# Patient Record
Sex: MALE | Race: WHITE | NOT HISPANIC OR LATINO | Employment: FULL TIME | URBAN - NONMETROPOLITAN AREA
[De-identification: names, ages, dates, MRNs, and addresses within clinical notes are randomized per-mention and may not be internally consistent; named-entity substitution may affect disease eponyms.]

---

## 2017-01-11 ENCOUNTER — OFFICE VISIT (OUTPATIENT)
Dept: FAMILY MEDICINE | Facility: CLINIC | Age: 47
End: 2017-01-11
Payer: COMMERCIAL

## 2017-01-11 VITALS
TEMPERATURE: 97.9 F | DIASTOLIC BLOOD PRESSURE: 88 MMHG | SYSTOLIC BLOOD PRESSURE: 132 MMHG | HEART RATE: 57 BPM | WEIGHT: 192 LBS | BODY MASS INDEX: 26.88 KG/M2 | RESPIRATION RATE: 16 BRPM | OXYGEN SATURATION: 98 % | HEIGHT: 71 IN

## 2017-01-11 DIAGNOSIS — I25.10 CORONARY ARTERY DISEASE INVOLVING NATIVE CORONARY ARTERY OF NATIVE HEART WITHOUT ANGINA PECTORIS: Primary | ICD-10-CM

## 2017-01-11 DIAGNOSIS — E78.5 HYPERLIPIDEMIA LDL GOAL <130: ICD-10-CM

## 2017-01-11 DIAGNOSIS — I10 ESSENTIAL HYPERTENSION: ICD-10-CM

## 2017-01-11 DIAGNOSIS — B02.9 HERPES ZOSTER WITHOUT COMPLICATION: ICD-10-CM

## 2017-01-11 LAB
ANION GAP SERPL CALCULATED.3IONS-SCNC: 5 MMOL/L (ref 3–14)
BUN SERPL-MCNC: 20 MG/DL (ref 7–30)
CALCIUM SERPL-MCNC: 8.9 MG/DL (ref 8.5–10.1)
CHLORIDE SERPL-SCNC: 108 MMOL/L (ref 94–109)
CO2 SERPL-SCNC: 30 MMOL/L (ref 20–32)
CREAT SERPL-MCNC: 1.19 MG/DL (ref 0.66–1.25)
GFR SERPL CREATININE-BSD FRML MDRD: 65 ML/MIN/1.7M2
GLUCOSE SERPL-MCNC: 90 MG/DL (ref 70–99)
POTASSIUM SERPL-SCNC: 4.5 MMOL/L (ref 3.4–5.3)
SODIUM SERPL-SCNC: 143 MMOL/L (ref 133–144)

## 2017-01-11 PROCEDURE — 80048 BASIC METABOLIC PNL TOTAL CA: CPT | Performed by: FAMILY MEDICINE

## 2017-01-11 PROCEDURE — 36415 COLL VENOUS BLD VENIPUNCTURE: CPT | Performed by: FAMILY MEDICINE

## 2017-01-11 PROCEDURE — 99214 OFFICE O/P EST MOD 30 MIN: CPT | Performed by: FAMILY MEDICINE

## 2017-01-11 RX ORDER — VALACYCLOVIR HYDROCHLORIDE 1 G/1
1000 TABLET, FILM COATED ORAL 3 TIMES DAILY
Qty: 21 TABLET | Refills: 0 | Status: SHIPPED | OUTPATIENT
Start: 2017-01-11 | End: 2018-01-29

## 2017-01-11 RX ORDER — ATORVASTATIN CALCIUM 80 MG/1
80 TABLET, FILM COATED ORAL AT BEDTIME
Qty: 90 TABLET | Refills: 3 | Status: SHIPPED | OUTPATIENT
Start: 2017-01-11 | End: 2017-12-18

## 2017-01-11 RX ORDER — LISINOPRIL 10 MG/1
10 TABLET ORAL DAILY
Qty: 90 TABLET | Refills: 3 | Status: SHIPPED | OUTPATIENT
Start: 2017-01-11 | End: 2017-12-18

## 2017-01-11 NOTE — PROGRESS NOTES
"  SUBJECTIVE:                                                    Talon Sanchez is a 46 year old male who presents to clinic today for the following health issues:      Hypertension Follow-up      Outpatient blood pressures are not being checked.    Low Salt Diet: not monitoring salt       Amount of exercise or physical activity: None    Problems taking medications regularly: No    Medication side effects: none    Diet: regular (no restrictions)    Rash      Duration: 3 days ago and worsening     Description  Location: Face, forehead, left eye, left side of face down to jaw line   Itching: no    Intensity:  moderate    Accompanying signs and symptoms: burning, red blotches, swelling on eye lid     History (similar episodes/previous evaluation): None    Precipitating or alleviating factors:  New exposures:  None  Recent travel: no      Therapies tried and outcome: none       S: Talon Sanchez is a 46 year old male with CAD.  Stable since stent.  No cp or sob  Htn: lisinopril.  Controlled. Labs and fills  Hyperlipidemia: high dose statin.  Tolerating  Rash on face.  A few days.  Jauregui, L forehead.  Some on upper lid    Problem list, med list, additional histories reviewed and updated, as indicated.      No cp or sob    O:/88 mmHg  Pulse 57  Temp(Src) 97.9  F (36.6  C) (Tympanic)  Resp 16  Ht 5' 11\" (1.803 m)  Wt 192 lb (87.091 kg)  BMI 26.79 kg/m2  SpO2 98%  GEN: Alert and oriented, in no acute distress  CV: RRR, no murmur  RESP: lungs clear bilaterally, good effort  EXT: no edema or lesions noted in lower extremities  Has some red spots with early vesicles L forehead.  Eye fine, other than some lid redness    Fluorescein and blue light with no corneal lesions.    A: cad, stable      Htn, stable       Hyperlipidemia, stable       Early shingles    P: valtrex. F/u if eye bothering or rash worsening  Fills on other meds.  Update labs    Weight management plan: diet/exercise     "

## 2017-01-11 NOTE — MR AVS SNAPSHOT
"              After Visit Summary   2017    Talon Sanchez    MRN: [id number]           Patient Information     Date Of Birth          1970        Visit Information        Provider Department      2017 11:20 AM Orlin Marie MD [facility]        Today's Diagnoses     Coronary artery disease involving native coronary artery of native heart without angina pectoris    -  1     Hyperlipidemia LDL goal <130         Essential hypertension         Herpes zoster without complication            Follow-ups after your visit        Who to contact     If you have questions or need follow up information about today's clinic visit or your schedule please contact [facility] directly at [phone].  Normal or non-critical lab and imaging results will be communicated to you by MyChart, letter or phone within 4 business days after the clinic has received the results. If you do not hear from us within 7 days, please contact the clinic through [employer] or phone. If you have a critical or abnormal lab result, we will notify you by phone as soon as possible.  Submit refill requests through [employer] or call your pharmacy and they will forward the refill request to us. Please allow 3 business days for your refill to be completed.          Additional Information About Your Visit        MyChart Information     [employer] lets you send messages to your doctor, view your test results, renew your prescriptions, schedule appointments and more. To sign up, go to [email or web address] . Click on \"Log in\" on the left side of the screen, which will take you to the Welcome page. Then click on \"Sign up Now\" on the right side of the page.     You will be asked to enter the access code listed below, as well as some personal information. Please follow the directions to create your username and password.     Your access code is: [email or web address]  Expires: 2017 11:53 AM     Your access code will  in 90 " "days. If you need help or a new code, please call your [place] clinic or [phone].        Care EveryWhere ID     This is your Care EveryWhere ID. This could be used by other organizations to access your [place] medical records  [id number]        Your Vitals Were     Pulse Temperature Respirations Height BMI (Body Mass Index) Pulse Oximetry    57 97.9  F (36.6  C) (Tympanic) 16 5' 11\" (1.803 m) 26.79 kg/m2 98%       Blood Pressure from Last 3 Encounters:   01/11/17 132/88   08/28/15 131/90   08/27/14 128/70    Weight from Last 3 Encounters:   01/11/17 192 lb (87.091 kg)   08/28/15 190 lb 1.6 oz (86.229 kg)   08/27/14 188 lb (85.276 kg)              We Performed the Following     Basic metabolic panel  (Ca, Cl, CO2, Creat, Gluc, K, Na, BUN)          Today's Medication Changes          These changes are accurate as of: 1/11/17 11:53 AM.  If you have any questions, ask your nurse or doctor.               Start taking these medicines.        Dose/Directions    valACYclovir 1000 mg tablet   Commonly known as:  VALTREX   Used for:  Herpes zoster without complication   Started by:  Orlin Marie MD        Dose:  1000 mg   Take 1 tablet (1,000 mg) by mouth 3 times daily   Quantity:  21 tablet   Refills:  0            Where to get your medicines      These medications were sent to [facility] - [place], [place] - [address] [place] [address]     Phone:  [phone]    - valACYclovir 1000 mg tablet      These medications were sent to [facility] - [place], [place] - [address] [place], [place] [address] [facility] [address], [facility] [address]     Phone:  [phone]    - atorvastatin 80 MG tablet  - lisinopril 10 MG tablet             Primary Care Provider Office Phone # Fax #    Orlin Marie [phone]       [facility] [address] [facility] [address]        Thank you!     Thank you for choosing [facility]  for " your care. Our goal is always to provide you with excellent care. Hearing back from our patients is one way we can continue to improve our services. Please take a few minutes to complete the written survey that you may receive in the mail after your visit with us. Thank you!             Your Updated Medication List - Protect others around you: Learn how to safely use, store and throw away your medicines at www.disposemymeds.org.          This list is accurate as of: 1/11/17 11:53 AM.  Always use your most recent med list.                   Brand Name Dispense Instructions for use    aspirin 81 MG EC tablet     100 tablet    Take 1 tablet (81 mg) by mouth daily       atorvastatin 80 MG tablet    LIPITOR    90 tablet    Take 1 tablet (80 mg) by mouth At Bedtime       lisinopril 10 MG tablet    PRINIVIL/ZESTRIL    90 tablet    Take 1 tablet (10 mg) by mouth daily       MULTIPLE VITAMIN PO      Take 1 tablet by mouth daily       valACYclovir 1000 mg tablet    VALTREX    21 tablet    Take 1 tablet (1,000 mg) by mouth 3 times daily

## 2017-01-11 NOTE — NURSING NOTE
"Chief Complaint   Patient presents with     Hypertension     Derm Problem     rash on face     /88 mmHg  Pulse 57  Temp(Src) 97.9  F (36.6  C) (Tympanic)  Resp 16  Ht 5' 11\" (1.803 m)  Wt 192 lb (87.091 kg)  BMI 26.79 kg/m2  SpO2 98% Estimated body mass index is 26.79 kg/(m^2) as calculated from the following:    Height as of this encounter: 5' 11\" (1.803 m).    Weight as of this encounter: 192 lb (87.091 kg).  bp completed using cuff size: regular      Health Maintenance that is potentially due pending provider review:  Flu Shot, Patient denied.    {Courtney Jaffe      "

## 2017-01-11 NOTE — Clinical Note
[facility]  [address] [facility] [address]  Phone: [phone]    January 12, 2017    Talon Sanchez  [address] [facility] [address]              Dear Mr. Sanchez,      Labs look good.  I hope things are going well.        Sincerely,      Orlin Marie MD/ ebp

## 2017-03-29 ENCOUNTER — OFFICE VISIT (OUTPATIENT)
Dept: FAMILY MEDICINE | Facility: CLINIC | Age: 47
End: 2017-03-29
Payer: COMMERCIAL

## 2017-03-29 VITALS
BODY MASS INDEX: 25.8 KG/M2 | OXYGEN SATURATION: 96 % | WEIGHT: 185 LBS | RESPIRATION RATE: 16 BRPM | DIASTOLIC BLOOD PRESSURE: 70 MMHG | SYSTOLIC BLOOD PRESSURE: 122 MMHG | TEMPERATURE: 98.3 F | HEART RATE: 67 BPM

## 2017-03-29 DIAGNOSIS — R68.89 FLU-LIKE SYMPTOMS: Primary | ICD-10-CM

## 2017-03-29 PROCEDURE — 99213 OFFICE O/P EST LOW 20 MIN: CPT | Performed by: FAMILY MEDICINE

## 2017-03-29 NOTE — MR AVS SNAPSHOT
After Visit Summary   3/29/2017    Talon Sanchez    MRN: [id number]           Patient Information     Date Of Birth          1970        Visit Information        Provider Department      3/29/2017 8:40 AM Orlin Marie MD [facility]        Today's Diagnoses     Flu-like symptoms    -  1       Follow-ups after your visit        Who to contact     If you have questions or need follow up information about today's clinic visit or your schedule please contact [facility] directly at [phone].  Normal or non-critical lab and imaging results will be communicated to you by MyChart, letter or phone within 4 business days after the clinic has received the results. If you do not hear from us within 7 days, please contact the clinic through [employer] or phone. If you have a critical or abnormal lab result, we will notify you by phone as soon as possible.  Submit refill requests through [employer] or call your pharmacy and they will forward the refill request to us. Please allow 3 business days for your refill to be completed.          Additional Information About Your Visit        MyChart Information     [employer] gives you secure access to your electronic health record. If you see a primary care provider, you can also send messages to your care team and make appointments. If you have questions, please call your primary care clinic.  If you do not have a primary care provider, please call [phone] and they will assist you.        Care EveryWhere ID     This is your Care EveryWhere ID. This could be used by other organizations to access your [place] medical records  [id number]        Your Vitals Were     Pulse Temperature Respirations Pulse Oximetry BMI (Body Mass Index)       67 98.3  F (36.8  C) (Tympanic) 16 96% 25.8 kg/m2        Blood Pressure from Last 3 Encounters:   03/29/17 122/70   01/11/17 132/88   08/28/15 131/90    Weight from Last 3 Encounters:   03/29/17 185 lb  (83.9 kg)   01/11/17 192 lb (87.1 kg)   08/28/15 190 lb 1.6 oz (86.2 kg)              Today, you had the following     No orders found for display       Primary Care Provider Office Phone # Fax #    Orlin Marie [phone]       [facility] [address] [facility] [address]        Thank you!     Thank you for choosing [facility]  for your care. Our goal is always to provide you with excellent care. Hearing back from our patients is one way we can continue to improve our services. Please take a few minutes to complete the written survey that you may receive in the mail after your visit with us. Thank you!             Your Updated Medication List - Protect others around you: Learn how to safely use, store and throw away your medicines at www.disposemymeds.org.          This list is accurate as of: 3/29/17 10:34 AM.  Always use your most recent med list.                   Brand Name Dispense Instructions for use    aspirin 81 MG EC tablet     100 tablet    Take 1 tablet (81 mg) by mouth daily       atorvastatin 80 MG tablet    LIPITOR    90 tablet    Take 1 tablet (80 mg) by mouth At Bedtime       lisinopril 10 MG tablet    PRINIVIL/ZESTRIL    90 tablet    Take 1 tablet (10 mg) by mouth daily       MULTIPLE VITAMIN PO      Take 1 tablet by mouth daily       valACYclovir 1000 mg tablet    VALTREX    21 tablet    Take 1 tablet (1,000 mg) by mouth 3 times daily

## 2017-03-29 NOTE — PROGRESS NOTES
SUBJECTIVE:                                                    Talon Sanchez is a 46 year old male who presents to clinic today for the following health issues:       Illness       Duration: 10 days    Description (location/character/radiation):  Fever, body aches, abdominal pain, loose stools, fatigue     Intensity:  moderate    Accompanying signs and symptoms: none    History (similar episodes/previous evaluation): Daughter with Influenza B    Precipitating or alleviating factors: None    Therapies tried and outcome: Dayquil    Patient has had rhinorrhea since Monday 3/20/2017.  Thursday 3/23/17 had continued rhinorrhea and developed cough, fever of 101, intermittent chills, body aches particularly in the lumbar spine, GI upset including some epigastric cramping along with loose stools.  Denies nausea, vomiting, night sweats, constipation, hematochezia, hemoptysis, hematemesis, changes in vision, chest pain, jaw or shoulder pain, shortness of breath.      Problem list and histories reviewed & adjusted, as indicated.  Additional history: none    Labs reviewed in EPIC    Reviewed and updated as needed this visit by clinical staff  Tobacco  Allergies  Med Hx  Surg Hx  Fam Hx  Soc Hx      Reviewed and updated as needed this visit by Provider         ROS:  Constitutional, HEENT, cardiovascular, pulmonary, gi and gu systems are negative, except as otherwise noted.    OBJECTIVE:                                                    /70 (BP Location: Right arm, Cuff Size: Adult Regular)  Pulse 67  Temp 98.3  F (36.8  C) (Tympanic)  Resp 16  Wt 185 lb (83.9 kg)  SpO2 96%  BMI 25.8 kg/m2  Body mass index is 25.8 kg/(m^2).  General: A&Ox3, not toxic appearing, in no acute distress  Head: Normocephalic/atraumatic.  Eyes: EOM grossly intact, PERRL  Ears: EAC clear, tympanic membranes intact, flat and light reflex intact  Nose: No septal deviation, membranes moist  Throat/mouth: No lesions on oral mucousa, good  dentition without signs of caries, no posterior pharyngeal erythema or petechiae   Neck: Trachea midline, no anterior or posterior cervical adenopathy.  No masses present.  Lungs: Respirations easy, CTA bilaterally with good chest wall excursion  Heart: RRR w/o obvious rubs or murmurs  Abdomen: Bowel sounds active, no tenderness to palpation  Integument: Intact, no rashes, no edema or sores present        ASSESSMENT/PLAN:                                                    Influenza-like illness  Patient has recent sick exposure to his 9-year old daughter who tested positive for influenza B last week.  He has typical fever, chills and body aches and influenza is currently prominent in the community.  He has been symptomatic for at least the last week. -Take copious flulids  -Rest as needed  -May use OTC cold therapy as needed, counseled on the lack of evidence on the efficacy of these.  -Allow time for this viral illness to clear.  Symptoms can remain, especially cough for as much as 4 weeks, sometimes longer  Follow up with Provider not necessary unless symptoms not improved over the next few weeks.      Orlin Marie MD  [facility]

## 2017-03-29 NOTE — NURSING NOTE
"Chief Complaint   Patient presents with     URI     x 10 days       Initial /70 (BP Location: Right arm, Cuff Size: Adult Regular)  Pulse 67  Temp 98.3  F (36.8  C) (Tympanic)  Resp 16  Wt 185 lb (83.9 kg)  SpO2 96%  BMI 25.8 kg/m2 Estimated body mass index is 25.8 kg/(m^2) as calculated from the following:    Height as of 1/11/17: 5' 11\" (1.803 m).    Weight as of this encounter: 185 lb (83.9 kg).  Medication Reconciliation: complete    Health Maintenance that is potentially due pending provider review:  NONE    n/a    Christine Springer, CMA    "

## 2017-12-18 DIAGNOSIS — E78.5 HYPERLIPIDEMIA LDL GOAL <130: Primary | ICD-10-CM

## 2017-12-18 DIAGNOSIS — I10 HTN (HYPERTENSION): ICD-10-CM

## 2017-12-18 RX ORDER — LISINOPRIL 10 MG/1
10 TABLET ORAL DAILY
Qty: 90 TABLET | Refills: 0 | Status: SHIPPED | OUTPATIENT
Start: 2017-12-18 | End: 2018-01-29

## 2017-12-18 RX ORDER — ATORVASTATIN CALCIUM 80 MG/1
80 TABLET, FILM COATED ORAL DAILY
Qty: 90 TABLET | Refills: 0 | Status: SHIPPED | OUTPATIENT
Start: 2017-12-18 | End: 2018-01-29

## 2018-01-29 ENCOUNTER — OFFICE VISIT (OUTPATIENT)
Dept: FAMILY MEDICINE | Facility: CLINIC | Age: 48
End: 2018-01-29
Payer: COMMERCIAL

## 2018-01-29 VITALS
DIASTOLIC BLOOD PRESSURE: 80 MMHG | TEMPERATURE: 97.6 F | BODY MASS INDEX: 26.5 KG/M2 | SYSTOLIC BLOOD PRESSURE: 110 MMHG | RESPIRATION RATE: 14 BRPM | HEART RATE: 60 BPM | OXYGEN SATURATION: 97 % | WEIGHT: 190 LBS

## 2018-01-29 DIAGNOSIS — I25.10 CORONARY ARTERY DISEASE INVOLVING NATIVE CORONARY ARTERY OF NATIVE HEART WITHOUT ANGINA PECTORIS: Primary | ICD-10-CM

## 2018-01-29 DIAGNOSIS — E78.5 HYPERLIPIDEMIA LDL GOAL <130: ICD-10-CM

## 2018-01-29 DIAGNOSIS — I10 ESSENTIAL HYPERTENSION: ICD-10-CM

## 2018-01-29 PROCEDURE — 99214 OFFICE O/P EST MOD 30 MIN: CPT | Performed by: FAMILY MEDICINE

## 2018-01-29 PROCEDURE — 80048 BASIC METABOLIC PNL TOTAL CA: CPT | Performed by: FAMILY MEDICINE

## 2018-01-29 PROCEDURE — 36415 COLL VENOUS BLD VENIPUNCTURE: CPT | Performed by: FAMILY MEDICINE

## 2018-01-29 RX ORDER — ATORVASTATIN CALCIUM 80 MG/1
80 TABLET, FILM COATED ORAL DAILY
Qty: 90 TABLET | Refills: 3 | Status: SHIPPED | OUTPATIENT
Start: 2018-01-29 | End: 2019-01-08

## 2018-01-29 RX ORDER — LISINOPRIL 10 MG/1
10 TABLET ORAL DAILY
Qty: 90 TABLET | Refills: 3 | Status: SHIPPED | OUTPATIENT
Start: 2018-01-29 | End: 2019-01-08

## 2018-01-29 NOTE — MR AVS SNAPSHOT
After Visit Summary   1/29/2018    Talon Sanchez    MRN: [id number]           Patient Information     Date Of Birth          1970        Visit Information        Provider Department      1/29/2018 1:40 PM Orlin Marie MD [facility]        Today's Diagnoses     Coronary artery disease involving native coronary artery of native heart without angina pectoris    -  1    Hyperlipidemia LDL goal <130        Essential hypertension           Follow-ups after your visit        Who to contact     If you have questions or need follow up information about today's clinic visit or your schedule please contact [facility] directly at [phone].  Normal or non-critical lab and imaging results will be communicated to you by MyChart, letter or phone within 4 business days after the clinic has received the results. If you do not hear from us within 7 days, please contact the clinic through [employer] or phone. If you have a critical or abnormal lab result, we will notify you by phone as soon as possible.  Submit refill requests through [employer] or call your pharmacy and they will forward the refill request to us. Please allow 3 business days for your refill to be completed.          Additional Information About Your Visit        MyChart Information     [employer] gives you secure access to your electronic health record. If you see a primary care provider, you can also send messages to your care team and make appointments. If you have questions, please call your primary care clinic.  If you do not have a primary care provider, please call [phone] and they will assist you.        Care EveryWhere ID     This is your Care EveryWhere ID. This could be used by other organizations to access your [place] medical records  [id number]        Your Vitals Were     Pulse Temperature Respirations Pulse Oximetry BMI (Body Mass Index)       60 97.6  F (36.4  C) (Tympanic) 14 97% 26.5 kg/m2         Blood Pressure from Last 3 Encounters:   01/29/18 110/80   03/29/17 122/70   01/11/17 132/88    Weight from Last 3 Encounters:   01/29/18 190 lb (86.2 kg)   03/29/17 185 lb (83.9 kg)   01/11/17 192 lb (87.1 kg)              We Performed the Following     Basic metabolic panel  (Ca, Cl, CO2, Creat, Gluc, K, Na, BUN)          Today's Medication Changes          These changes are accurate as of 1/29/18  2:13 PM.  If you have any questions, ask your nurse or doctor.               These medicines have changed or have updated prescriptions.        Dose/Directions    atorvastatin 80 MG tablet   Commonly known as:  LIPITOR   This may have changed:  additional instructions   Used for:  Hyperlipidemia LDL goal <130   Changed by:  Orlin Marie MD        Dose:  80 mg   Take 1 tablet (80 mg) by mouth daily   Quantity:  90 tablet   Refills:  3       lisinopril 10 MG tablet   Commonly known as:  PRINIVIL/ZESTRIL   This may have changed:  additional instructions   Used for:  Essential hypertension   Changed by:  Orlin Marie MD        Dose:  10 mg   Take 1 tablet (10 mg) by mouth daily   Quantity:  90 tablet   Refills:  3         Stop taking these medicines if you haven't already. Please contact your care team if you have questions.     valACYclovir 1000 mg tablet   Commonly known as:  VALTREX   Stopped by:  Orlin Marie MD                Where to get your medicines      Some of these will need a paper prescription and others can be bought over the counter.  Ask your nurse if you have questions.     Bring a paper prescription for each of these medications     atorvastatin 80 MG tablet    lisinopril 10 MG tablet                Primary Care Provider Office Phone # Fax #    Orlin Marie [phone]       [address] [place] [address]        Equal Access to Services     [facility] JOSEPH AH: Jose Maria Melendrez, padmini simpson, vince carranza  la'barbara ponce. So [facility] [phone].    ATENCIÓN: Si habla benitez, tiene a carrillo disposición servicios gratuitos de asistencia lingüística. Chelsie al [phone].    We comply with applicable federal civil rights laws and [place] laws. We do not discriminate on the basis of race, color, national origin, age, disability, sex, sexual orientation, or gender identity.            Thank you!     Thank you for choosing [facility]  for your care. Our goal is always to provide you with excellent care. Hearing back from our patients is one way we can continue to improve our services. Please take a few minutes to complete the written survey that you may receive in the mail after your visit with us. Thank you!             Your Updated Medication List - Protect others around you: Learn how to safely use, store and throw away your medicines at www.disposemymeds.org.          This list is accurate as of 1/29/18  2:13 PM.  Always use your most recent med list.                   Brand Name Dispense Instructions for use Diagnosis    aspirin 81 MG EC tablet     100 tablet    Take 1 tablet (81 mg) by mouth daily    NSTEMI (non-ST elevated myocardial infarction) (H), CAD (coronary artery disease)       atorvastatin 80 MG tablet    LIPITOR    90 tablet    Take 1 tablet (80 mg) by mouth daily    Hyperlipidemia LDL goal <130       lisinopril 10 MG tablet    PRINIVIL/ZESTRIL    90 tablet    Take 1 tablet (10 mg) by mouth daily    Essential hypertension       MULTIPLE VITAMIN PO      Take 1 tablet by mouth daily

## 2018-01-29 NOTE — NURSING NOTE
"Chief Complaint   Patient presents with     Hypertension       Initial /80  Pulse 60  Temp 97.6  F (36.4  C) (Tympanic)  Resp 14  Wt 190 lb (86.2 kg)  SpO2 97%  BMI 26.5 kg/m2 Estimated body mass index is 26.5 kg/(m^2) as calculated from the following:    Height as of 1/11/17: 5' 11\" (1.803 m).    Weight as of this encounter: 190 lb (86.2 kg).  Medication Reconciliation: complete    Health Maintenance that is potentially due pending provider review:  NONE    n/a    Is there anyone who you would like to be able to receive your results? No  If yes have patient fill out GALI      "

## 2018-01-29 NOTE — PROGRESS NOTES
SUBJECTIVE:   Talon Sanchez is a 47 year old male who presents to clinic today for the following health issues:      Hypertension Follow-up      Outpatient blood pressures are not being checked.    Low Salt Diet: low salt      s Talon Sanchez is a 47 year old male with htn: stable on low dose lisinopril  Cad: stable, no cp or sob  Hyperlipidemia: stable on statin    No cp or sob    Problem list, med list, additional histories reviewed and updated, as indicated.      O:/80  Pulse 60  Temp 97.6  F (36.4  C) (Tympanic)  Resp 14  Wt 190 lb (86.2 kg)  SpO2 97%  BMI 26.5 kg/m2  GEN: Alert and oriented, in no acute distress  CV: RRR, no murmur  RESP: lungs clear bilaterally, good effort  EXT: no edema or lesions noted in lower extremities    A:   htn: stable on low dose lisinopril  Cad: stable, no cp or sob  Hyperlipidemia: stable on statin    P: fills, labs.  Doing well.  Refuses flu shot    Weight management plan: diet/exercise

## 2018-01-30 LAB
ANION GAP SERPL CALCULATED.3IONS-SCNC: 6 MMOL/L (ref 3–14)
BUN SERPL-MCNC: 15 MG/DL (ref 7–30)
CALCIUM SERPL-MCNC: 8.8 MG/DL (ref 8.5–10.1)
CHLORIDE SERPL-SCNC: 107 MMOL/L (ref 94–109)
CO2 SERPL-SCNC: 24 MMOL/L (ref 20–32)
CREAT SERPL-MCNC: 1.1 MG/DL (ref 0.66–1.25)
GFR SERPL CREATININE-BSD FRML MDRD: 71 ML/MIN/1.7M2
GLUCOSE SERPL-MCNC: 73 MG/DL (ref 70–99)
POTASSIUM SERPL-SCNC: 4.1 MMOL/L (ref 3.4–5.3)
SODIUM SERPL-SCNC: 137 MMOL/L (ref 133–144)

## 2019-01-08 DIAGNOSIS — I10 ESSENTIAL HYPERTENSION: ICD-10-CM

## 2019-01-08 DIAGNOSIS — E78.5 HYPERLIPIDEMIA LDL GOAL <130: ICD-10-CM

## 2019-01-08 RX ORDER — ATORVASTATIN CALCIUM 80 MG/1
TABLET, FILM COATED ORAL
Qty: 30 TABLET | Refills: 0 | Status: SHIPPED | OUTPATIENT
Start: 2019-01-08 | End: 2019-03-20

## 2019-01-08 RX ORDER — LISINOPRIL 10 MG/1
TABLET ORAL
Qty: 30 TABLET | Refills: 0 | Status: SHIPPED | OUTPATIENT
Start: 2019-01-08 | End: 2019-03-20

## 2019-01-08 NOTE — TELEPHONE ENCOUNTER
"Requested Prescriptions   Pending Prescriptions Disp Refills     atorvastatin (LIPITOR) 80 MG tablet [Pharmacy Med Name: ATORVASTATIN TABS 80MG] 90 tablet 3     Sig: TAKE 1 TABLET DAILY    Statins Protocol Failed - 1/8/2019  1:01 AM       Failed - LDL on file in past 12 months    Recent Labs   Lab Test 08/28/15  1305   LDL 77            Passed - No abnormal creatine kinase in past 12 months    No lab results found.            Passed - Recent (12 mo) or future (30 days) visit within the authorizing provider's specialty    Patient had office visit in the last 12 months or has a visit in the next 30 days with authorizing provider or within the authorizing provider's specialty.  See \"Patient Info\" tab in inbasket, or \"Choose Columns\" in Meds & Orders section of the refill encounter.      Last Written Prescription Date:  1/29/18  Last Fill Quantity: 90,  # refills: 3   Last office visit: 1/29/2018 with prescribing provider:     Future Office Visit:               Passed - Medication is active on med list       Passed - Patient is age 18 or older        lisinopril (PRINIVIL/ZESTRIL) 10 MG tablet [Pharmacy Med Name: LISINOPRIL TABS 10MG] 90 tablet 3     Sig: TAKE 1 TABLET DAILY    ACE Inhibitors (Including Combos) Protocol Passed - 1/8/2019  1:01 AM       Passed - Blood pressure under 140/90 in past 12 months    BP Readings from Last 3 Encounters:   01/29/18 110/80   03/29/17 122/70   01/11/17 132/88                Passed - Recent (12 mo) or future (30 days) visit within the authorizing provider's specialty    Patient had office visit in the last 12 months or has a visit in the next 30 days with authorizing provider or within the authorizing provider's specialty.  See \"Patient Info\" tab in inbasket, or \"Choose Columns\" in Meds & Orders section of the refill encounter.      Last Written Prescription Date:  1/29/18  Last Fill Quantity: 90,  # refills: 3   Last office visit: 1/29/2018 with prescribing provider:     Future " Office Visit:               Passed - Medication is active on med list       Passed - Patient is age 18 or older       Passed - Normal serum creatinine on file in past 12 months    Recent Labs   Lab Test 01/29/18  1411   CR 1.10            Passed - Normal serum potassium on file in past 12 months    Recent Labs   Lab Test 01/29/18  1411   POTASSIUM 4.1

## 2019-03-20 ENCOUNTER — OFFICE VISIT (OUTPATIENT)
Dept: FAMILY MEDICINE | Facility: CLINIC | Age: 49
End: 2019-03-20
Payer: COMMERCIAL

## 2019-03-20 VITALS
WEIGHT: 189 LBS | BODY MASS INDEX: 27.06 KG/M2 | HEIGHT: 70 IN | DIASTOLIC BLOOD PRESSURE: 82 MMHG | SYSTOLIC BLOOD PRESSURE: 118 MMHG | HEART RATE: 64 BPM | RESPIRATION RATE: 18 BRPM

## 2019-03-20 DIAGNOSIS — E78.5 HYPERLIPIDEMIA LDL GOAL <130: ICD-10-CM

## 2019-03-20 DIAGNOSIS — Z12.11 SPECIAL SCREENING FOR MALIGNANT NEOPLASMS, COLON: ICD-10-CM

## 2019-03-20 DIAGNOSIS — I10 ESSENTIAL HYPERTENSION: Primary | ICD-10-CM

## 2019-03-20 LAB
ANION GAP SERPL CALCULATED.3IONS-SCNC: 4 MMOL/L (ref 3–14)
BASOPHILS # BLD AUTO: 0.1 10E9/L (ref 0–0.2)
BASOPHILS NFR BLD AUTO: 0.8 %
BUN SERPL-MCNC: 16 MG/DL (ref 7–30)
CALCIUM SERPL-MCNC: 9 MG/DL (ref 8.5–10.1)
CHLORIDE SERPL-SCNC: 108 MMOL/L (ref 94–109)
CHOLEST SERPL-MCNC: 190 MG/DL
CO2 SERPL-SCNC: 29 MMOL/L (ref 20–32)
CREAT SERPL-MCNC: 1.23 MG/DL (ref 0.66–1.25)
DIFFERENTIAL METHOD BLD: NORMAL
EOSINOPHIL # BLD AUTO: 0.3 10E9/L (ref 0–0.7)
EOSINOPHIL NFR BLD AUTO: 5 %
ERYTHROCYTE [DISTWIDTH] IN BLOOD BY AUTOMATED COUNT: 12.9 % (ref 10–15)
GFR SERPL CREATININE-BSD FRML MDRD: 68 ML/MIN/{1.73_M2}
GLUCOSE SERPL-MCNC: 81 MG/DL (ref 70–99)
HCT VFR BLD AUTO: 45 % (ref 40–53)
HDLC SERPL-MCNC: 39 MG/DL
HGB BLD-MCNC: 15.1 G/DL (ref 13.3–17.7)
LDLC SERPL CALC-MCNC: 108 MG/DL
LYMPHOCYTES # BLD AUTO: 1.8 10E9/L (ref 0.8–5.3)
LYMPHOCYTES NFR BLD AUTO: 26.6 %
MCH RBC QN AUTO: 29 PG (ref 26.5–33)
MCHC RBC AUTO-ENTMCNC: 33.6 G/DL (ref 31.5–36.5)
MCV RBC AUTO: 86 FL (ref 78–100)
MONOCYTES # BLD AUTO: 0.6 10E9/L (ref 0–1.3)
MONOCYTES NFR BLD AUTO: 9.6 %
NEUTROPHILS # BLD AUTO: 3.9 10E9/L (ref 1.6–8.3)
NEUTROPHILS NFR BLD AUTO: 58 %
NONHDLC SERPL-MCNC: 151 MG/DL
PLATELET # BLD AUTO: 293 10E9/L (ref 150–450)
POTASSIUM SERPL-SCNC: 4.5 MMOL/L (ref 3.4–5.3)
RBC # BLD AUTO: 5.21 10E12/L (ref 4.4–5.9)
SODIUM SERPL-SCNC: 141 MMOL/L (ref 133–144)
TRIGL SERPL-MCNC: 215 MG/DL
WBC # BLD AUTO: 6.7 10E9/L (ref 4–11)

## 2019-03-20 PROCEDURE — 80061 LIPID PANEL: CPT | Performed by: NURSE PRACTITIONER

## 2019-03-20 PROCEDURE — 36415 COLL VENOUS BLD VENIPUNCTURE: CPT | Performed by: NURSE PRACTITIONER

## 2019-03-20 PROCEDURE — 80048 BASIC METABOLIC PNL TOTAL CA: CPT | Performed by: NURSE PRACTITIONER

## 2019-03-20 PROCEDURE — 85025 COMPLETE CBC W/AUTO DIFF WBC: CPT | Performed by: NURSE PRACTITIONER

## 2019-03-20 PROCEDURE — 99213 OFFICE O/P EST LOW 20 MIN: CPT | Performed by: NURSE PRACTITIONER

## 2019-03-20 RX ORDER — LISINOPRIL 10 MG/1
10 TABLET ORAL DAILY
Qty: 90 TABLET | Refills: 3 | Status: SHIPPED | OUTPATIENT
Start: 2019-03-20 | End: 2020-02-10

## 2019-03-20 RX ORDER — ATORVASTATIN CALCIUM 80 MG/1
80 TABLET, FILM COATED ORAL DAILY
Qty: 90 TABLET | Refills: 3 | Status: SHIPPED | OUTPATIENT
Start: 2019-03-20 | End: 2020-02-10

## 2019-03-20 ASSESSMENT — MIFFLIN-ST. JEOR: SCORE: 1719.58

## 2019-03-20 NOTE — PROGRESS NOTES
SUBJECTIVE:   Talon Sanchez is a 48 year old male who presents to clinic today for the following health issues:    Hyperlipidemia Follow-Up      Rate your low fat/cholesterol diet?: fair    Taking statin?  Yes, no muscle aches from statin    Other lipid medications/supplements?:  none    Hypertension Follow-up      Outpatient blood pressures are not being checked.    Low Salt Diet: not monitoring salt      Amount of exercise or physical activity: None    Problems taking medications regularly: No    Medication side effects: none    Diet: regular (no restrictions)        Problem list and histories reviewed & adjusted, as indicated.  Additional history: as documented    Patient Active Problem List   Diagnosis     HTN (hypertension)     Chronic rhinitis     Hyperlipidemia LDL goal <130     CAD (coronary artery disease)     History reviewed. No pertinent surgical history.    Social History     Tobacco Use     Smoking status: Never Smoker     Smokeless tobacco: Never Used   Substance Use Topics     Alcohol use: No     Family History   Problem Relation Age of Onset     Breast Cancer Mother      Allergies Mother      Arthritis Mother      Osteoporosis Mother      Diabetes Father      Hypertension Father      Heart Disease Father          Current Outpatient Medications   Medication Sig Dispense Refill     aspirin EC 81 MG EC tablet Take 1 tablet (81 mg) by mouth daily 100 tablet 5     atorvastatin (LIPITOR) 80 MG tablet TAKE 1 TABLET DAILY 30 tablet 0     garlic 150 MG TABS tablet Take 150 mg by mouth daily       lisinopril (PRINIVIL/ZESTRIL) 10 MG tablet TAKE 1 TABLET DAILY 30 tablet 0     MULTIPLE VITAMIN PO Take 1 tablet by mouth daily       Allergies   Allergen Reactions     Amlodipine      Edema       Dust Mites      Food Other (See Comments)     Eggs     Milk Protein Extract      Mold      Recent Labs   Lab Test 01/29/18  1411 01/11/17  1154 08/28/15  1305 10/03/14  0830  07/29/14  0633  03/17/11  1034   A1C  --   --   " --   --   --  5.9  --   --    LDL  --   --  77 71  --  186*   < > 92   HDL  --   --  40* 44  --  43   < > 37*   TRIG  --   --  104 73  --  158*   < > 165*   ALT  --   --  55  --   --   --   --  56   CR 1.10 1.19 1.22 1.23   < > 1.31*   < > 1.04   GFRESTIMATED 71 65 64 63   < > 59*   < > 78   GFRESTBLACK 86 79 77 77   < > 71   < > >90   POTASSIUM 4.1 4.5 3.9 4.0   < > 4.4   < > 4.7    < > = values in this interval not displayed.      BP Readings from Last 3 Encounters:   03/20/19 118/82   01/29/18 110/80   03/29/17 122/70    Wt Readings from Last 3 Encounters:   03/20/19 85.7 kg (189 lb)   01/29/18 86.2 kg (190 lb)   03/29/17 83.9 kg (185 lb)              Reviewed and updated as needed this visit by clinical staff       Reviewed and updated as needed this visit by Provider         ROS:  Constitutional, HEENT, cardiovascular, pulmonary, gi and gu systems are negative, except as otherwise noted.    OBJECTIVE:     /82 (BP Location: Right arm, Patient Position: Sitting, Cuff Size: Adult Large)   Pulse 64   Resp 18   Ht 1.772 m (5' 9.75\")   Wt 85.7 kg (189 lb)   BMI 27.31 kg/m    Body mass index is 27.31 kg/m .  GENERAL: healthy, alert and no distress  EYES: Eyes grossly normal to inspection, PERRL and conjunctivae and sclerae normal  HENT: ear canals and TM's normal, nose and mouth without ulcers or lesions  NECK: no adenopathy, no asymmetry, masses, or scars and thyroid normal to palpation  RESP: lungs clear to auscultation - no rales, rhonchi or wheezes  CV: regular rate and rhythm, normal S1 S2, no S3 or S4, no murmur, click or rub, no peripheral edema and peripheral pulses strong  ABDOMEN: soft, nontender, no hepatosplenomegaly, no masses and bowel sounds normal  MS: no gross musculoskeletal defects noted, no edema  SKIN: no suspicious lesions or rashes  NEURO: Normal strength and tone, mentation intact and speech normal  PSYCH: mentation appears normal, affect normal/bright    Diagnostic Test " Results:  none     ASSESSMENT/PLAN:   (I10) Essential hypertension  (primary encounter diagnosis)  Comment: Controlled with current medications renewed today  Plan: Basic metabolic panel, CBC with platelets         differential, lisinopril (PRINIVIL/ZESTRIL) 10         MG tablet    (E78.5) Hyperlipidemia LDL goal <130  Comment: Controlled with current medications renewed today  Plan: Lipid Profile (Chol, Trig, HDL, LDL calc),         atorvastatin (LIPITOR) 80 MG tablet        (Z12.11) Special screening for malignant neoplasms, colon  Comment:   Plan: Fecal colorectal cancer screen (FIT)         ROMAN Rojo [facility]

## 2019-03-20 NOTE — PATIENT INSTRUCTIONS
Prevention Guidelines, Men Ages 50 to 64  Screening tests and vaccines are an important part of managing your health. A screening test is done to find possible disorders or diseases in people who don't have any symptoms. The goal is to find a disease early so lifestyle changes can be made and you can be watched more closely to reduce the risk of disease, or to detect it early enough to treat it most effectively. Screening tests are not considered diagnostic, but are used to determine if more testing is needed. Health counseling is essential, too. Below are guidelines for these, for men ages 50 to 64. Talk with your healthcare provider to make sure you re up-to-date on what you need.  Screening Who needs it How often   Alcohol misuse All men in this age group At routine exams   Blood pressure All men in this age group Yearly checkup if your blood pressure is normal  Normal blood pressure is less than 120/80 mm Hg  If your blood pressure reading is higher than normal, follow the advice of your healthcare provider      Colorectal cancer All men in this age group Flexible sigmoidoscopy every 5 years, or colonoscopy every 10 years, or double-contrast barium enema every 5 years; yearly fecal occult blood test or fecal immunochemical test; or a stool DNA test as often as your healthcare provider advises; talk with your healthcare provider about which tests are best for you   Depression All men in this age group At routine exams   Type 2 diabetes or prediabetes All men beginning at age 45 and men without symptoms at any age who are overweight or obese and have 1 or more other risk factors for diabetes At least every 3 years (yearly if your blood sugar has already begun to rise)   Type 2 diabetes All men with prediabetes Every year   Hepatitis C Men at increased risk for infection - talk with your healthcare provider At routine exams. All men ages 50 to 70 should be tested at least once for hepatitis C.   High  cholesterol or triglycerides All men in this age group At least every 5 years   HIV Men at increased risk for infection - talk with your healthcare provider At routine exams   Lung cancer Adults age 55 to 80 who have smoked Yearly screening in smokers with 30 pack-year history of smoking or who quit within 15 years   Obesity All men in this age group At routine exams   Prostate cancer Starting at age 45, talk to healthcare provider about risks and benefits of digital rectal exam (KATHIA) and prostate-specific antigen (PSA) screening1 At routine exams   Syphilis Men at increased risk for infection - talk with your healthcare provider At routine exams   Tuberculosis Men at increased risk for infection - talk with your healthcare provider Ask your healthcare provider   Vision All men in this age group Ask your healthcare provider   Vaccine Who needs it How often   Chickenpox (varicella) All men in this age group who have no record of this infection or vaccine 2 doses; second dose should be given at least 4 weeks after the first dose   Hepatitis A Men at increased risk for infection - talk with your healthcare provider 2 doses given at least 6 months apart   Hepatitis B Men at increased risk for infection - talk with your healthcare provider 3 doses over 6 months; second dose should be given 1 month after the first dose; the third dose should be given at least 2 months after the second dose and at least 4 months after the first dose   Haemophilus influenzae Type B (HIB) Men at increased risk for infection - talk with your healthcare provider 1 to 3 doses   Influenza (flu) All men in this age group Once a year   Measles, mumps, rubella (MMR) Men in this age group through their late 50s who have no record of these infections or vaccines 1 or 2 doses; ask your healthcare provider   Meningococcal Men at increased risk for infection - talk with your healthcare provider 1 or more doses   Pneumococcal conjugate vaccine  (PCV13) and pneumococcal polysaccharide vaccine (PPSV23) Men at increased risk for infection - talk with your healthcare provider PCV13: 1 dose ages 19 to 65 (protects against 13 types of pneumococcal bacteria)     PPSV23: 1 to 2 doses through age 64, or 1 dose at 65 or older (protects against 23 types of pneumococcal bacteria)      Tetanus/diphtheria/  pertussis (Td/Tdap) booster All men in this age group Td every 10 years, or a one-time dose of Tdap instead of a Td booster after age 18, then Td every 10 years   Zoster All men ages 60 and older 1 dose   Counseling Who needs it How often   Diet and exercise Men who are overweight or obese When diagnosed, and then at routine exams   Sexually transmitted infection prevention Men at increased risk for infection - talk with your healthcare provider At routine exams   Use of daily aspirin Men in this age group at risk for cardiovascular health problems At routine exams   Use of tobacco and the health effects it can cause All men in this age group Every visit   [address] [place] [address] [facility]  Date Last Reviewed: 2/1/2017 2000-2018 [employer]. [address] [place] [address], [place] [address]. All rights reserved. This information is not intended as a substitute for professional medical care. Always follow your healthcare professional's instructions.

## 2019-03-20 NOTE — NURSING NOTE
"Chief Complaint   Patient presents with     Hypertension     Lipids       Initial Ht 1.772 m (5' 9.75\")   Wt 85.7 kg (189 lb)   BMI 27.31 kg/m   Estimated body mass index is 27.31 kg/m  as calculated from the following:    Height as of this encounter: 1.772 m (5' 9.75\").    Weight as of this encounter: 85.7 kg (189 lb).    Patient presents to the clinic using No DME    Health Maintenance that is potentially due pending provider review:  Colonoscopy/FIT    Gave pt phone number/pended order to schedule mammo and/or colonoscopy(or FIT)    Is there anyone who you would like to be able to receive your results? No  If yes have patient fill out GALI      "

## 2019-03-27 PROCEDURE — 82274 ASSAY TEST FOR BLOOD FECAL: CPT | Performed by: NURSE PRACTITIONER

## 2019-03-30 DIAGNOSIS — Z12.11 SPECIAL SCREENING FOR MALIGNANT NEOPLASMS, COLON: ICD-10-CM

## 2019-03-30 LAB — HEMOCCULT STL QL IA: NEGATIVE

## 2020-02-10 ENCOUNTER — OFFICE VISIT (OUTPATIENT)
Dept: FAMILY MEDICINE | Facility: CLINIC | Age: 50
End: 2020-02-10
Payer: COMMERCIAL

## 2020-02-10 VITALS
BODY MASS INDEX: 26.48 KG/M2 | WEIGHT: 185 LBS | TEMPERATURE: 97.9 F | RESPIRATION RATE: 20 BRPM | HEIGHT: 70 IN | HEART RATE: 63 BPM | DIASTOLIC BLOOD PRESSURE: 96 MMHG | OXYGEN SATURATION: 96 % | SYSTOLIC BLOOD PRESSURE: 130 MMHG

## 2020-02-10 DIAGNOSIS — B35.6 TINEA CRURIS: ICD-10-CM

## 2020-02-10 DIAGNOSIS — E78.5 HYPERLIPIDEMIA LDL GOAL <130: ICD-10-CM

## 2020-02-10 DIAGNOSIS — Z12.11 SPECIAL SCREENING FOR MALIGNANT NEOPLASMS, COLON: ICD-10-CM

## 2020-02-10 DIAGNOSIS — I10 ESSENTIAL HYPERTENSION: Primary | ICD-10-CM

## 2020-02-10 DIAGNOSIS — I25.10 CORONARY ARTERY DISEASE INVOLVING NATIVE CORONARY ARTERY OF NATIVE HEART WITHOUT ANGINA PECTORIS: ICD-10-CM

## 2020-02-10 LAB
ANION GAP SERPL CALCULATED.3IONS-SCNC: 1 MMOL/L (ref 3–14)
BUN SERPL-MCNC: 15 MG/DL (ref 7–30)
CALCIUM SERPL-MCNC: 8.5 MG/DL (ref 8.5–10.1)
CHLORIDE SERPL-SCNC: 106 MMOL/L (ref 94–109)
CO2 SERPL-SCNC: 29 MMOL/L (ref 20–32)
CREAT SERPL-MCNC: 1.11 MG/DL (ref 0.66–1.25)
GFR SERPL CREATININE-BSD FRML MDRD: 76 ML/MIN/{1.73_M2}
GLUCOSE SERPL-MCNC: 118 MG/DL (ref 70–99)
POTASSIUM SERPL-SCNC: 4.1 MMOL/L (ref 3.4–5.3)
SODIUM SERPL-SCNC: 136 MMOL/L (ref 133–144)

## 2020-02-10 PROCEDURE — 36415 COLL VENOUS BLD VENIPUNCTURE: CPT | Performed by: FAMILY MEDICINE

## 2020-02-10 PROCEDURE — 80048 BASIC METABOLIC PNL TOTAL CA: CPT | Performed by: FAMILY MEDICINE

## 2020-02-10 PROCEDURE — 99214 OFFICE O/P EST MOD 30 MIN: CPT | Performed by: FAMILY MEDICINE

## 2020-02-10 RX ORDER — PRENATAL VIT 91/IRON/FOLIC/DHA 28-975-200
COMBINATION PACKAGE (EA) ORAL 2 TIMES DAILY
Qty: 30 G | Refills: 3 | Status: SHIPPED | OUTPATIENT
Start: 2020-02-10 | End: 2023-04-13

## 2020-02-10 RX ORDER — ATORVASTATIN CALCIUM 80 MG/1
80 TABLET, FILM COATED ORAL DAILY
Qty: 90 TABLET | Refills: 3 | Status: SHIPPED | OUTPATIENT
Start: 2020-02-10 | End: 2020-03-15

## 2020-02-10 RX ORDER — LISINOPRIL 20 MG/1
20 TABLET ORAL DAILY
Qty: 90 TABLET | Refills: 3 | Status: SHIPPED | OUTPATIENT
Start: 2020-02-10 | End: 2022-02-21

## 2020-02-10 ASSESSMENT — MIFFLIN-ST. JEOR: SCORE: 1696.43

## 2020-02-10 NOTE — LETTER
February 10, 2020      Talon Sanchez  [address] [place] [address]        Dear ,    We are writing to inform you of your test results.    Labs OK, slight increase in blood sugar. We'll check again next year.       I hope things are going well.    Resulted Orders   Basic metabolic panel  (Ca, Cl, CO2, Creat, Gluc, K, Na, BUN)   Result Value Ref Range    Sodium 136 133 - 144 mmol/L    Potassium 4.1 3.4 - 5.3 mmol/L    Chloride 106 94 - 109 mmol/L    Carbon Dioxide 29 20 - 32 mmol/L    Anion Gap 1 (L) 3 - 14 mmol/L    Glucose 118 (H) 70 - 99 mg/dL      Comment:      Non Fasting    Urea Nitrogen 15 7 - 30 mg/dL    Creatinine 1.11 0.66 - 1.25 mg/dL    GFR Estimate 76 >60 mL/min/[1.73_m2]      Comment:      Non  GFR Calc  Starting 12/18/2018, serum creatinine based estimated GFR (eGFR) will be   calculated using the Chronic Kidney Disease Epidemiology Collaboration   (CKD-EPI) equation.      GFR Estimate If Black 88 >60 mL/min/[1.73_m2]      Comment:       GFR Calc  Starting 12/18/2018, serum creatinine based estimated GFR (eGFR) will be   calculated using the Chronic Kidney Disease Epidemiology Collaboration   (CKD-EPI) equation.      Calcium 8.5 8.5 - 10.1 mg/dL       If you have any questions or concerns, please call the clinic at the number listed above.       Sincerely,        Orlin Marie MD/nasir

## 2020-02-10 NOTE — PROGRESS NOTES
"Talon Sanchez is a 49 year old male comes in today with the following concerns.      Hyperlipidemia Follow-Up      Are you regularly taking any medication or supplement to lower your cholesterol?   Yes- Lipitor    Are you having muscle aches or other side effects that you think could be caused by your cholesterol lowering medication?  No    Hypertension Follow-up      Do you check your blood pressure regularly outside of the clinic? Yes     Are you following a low salt diet? Yes    Are your blood pressures ever more than 140 on the top number (systolic) OR more   than 90 on the bottom number (diastolic), for example 140/90? Yes, diastolic above 90.      How many servings of fruits and vegetables do you eat daily?  2-3    On average, how many sweetened beverages do you drink each day (Examples: soda, juice, sweet tea, etc.  Do NOT count diet or artificially sweetened beverages)?   2    How many days per week do you exercise enough to make your heart beat faster? 3 or less    How many minutes a day do you exercise enough to make your heart beat faster? 20 - 29    How many days per week do you miss taking your medication? 0       Samina Roa CMA([facility])    *    S: Talon Sanchez is a 49 year old male with htn.  Borderline.  Only on 10mg ACE, willing to go to 20mg.      Hyperlipidemia: statin.  Fills  CAD: 2014 stent.  No cp or sob    Rash on groin.  Not improving with various prn creams.  Itchy somewhat.      Problem list, med list, additional histories reviewed and updated, as indicated.      O:BP (!) 130/96   Pulse 63   Temp 97.9  F (36.6  C) (Tympanic)   Resp 20   Ht 1.772 m (5' 9.75\")   Wt 83.9 kg (185 lb)   SpO2 96%   BMI 26.74 kg/m    GEN: Alert and oriented, in no acute distress  Has red patches with some active border in groin, inguiunal area.  Classic tinea.  Doesn't fluoresce with woods lamp    A: htn, elevated       Hyperlipidemia, statin      CAD, stable        Tinea cruris    P ;terbinafine " topically for rash.  F/u if not improving     Up to 20mg on lisinopril.  Continue other meds.  Willing to do FIT , doesn't want colonoscopy

## 2020-02-10 NOTE — NURSING NOTE
"Chief Complaint   Patient presents with     Recheck Medication       Initial BP (!) 130/96   Pulse 63   Temp 97.9  F (36.6  C) (Tympanic)   Resp 20   Ht 1.772 m (5' 9.75\")   Wt 83.9 kg (185 lb)   SpO2 96%   BMI 26.74 kg/m   Estimated body mass index is 26.74 kg/m  as calculated from the following:    Height as of this encounter: 1.772 m (5' 9.75\").    Weight as of this encounter: 83.9 kg (185 lb).    Patient presents to the clinic using No DME    Health Maintenance that is potentially due pending provider review:  FIT and Flu shot declined.       Is there anyone who you would like to be able to receive your results? No  If yes have patient fill out GALI    Samina Roa CMA([facility])    "

## 2020-03-05 ENCOUNTER — OFFICE VISIT (OUTPATIENT)
Dept: FAMILY MEDICINE | Facility: CLINIC | Age: 50
End: 2020-03-05
Payer: COMMERCIAL

## 2020-03-05 VITALS
HEART RATE: 66 BPM | DIASTOLIC BLOOD PRESSURE: 64 MMHG | BODY MASS INDEX: 26.48 KG/M2 | WEIGHT: 185 LBS | TEMPERATURE: 97.7 F | RESPIRATION RATE: 16 BRPM | SYSTOLIC BLOOD PRESSURE: 130 MMHG | OXYGEN SATURATION: 98 % | HEIGHT: 70 IN

## 2020-03-05 DIAGNOSIS — L08.1 ERYTHRASMA: Primary | ICD-10-CM

## 2020-03-05 PROCEDURE — 99213 OFFICE O/P EST LOW 20 MIN: CPT | Performed by: FAMILY MEDICINE

## 2020-03-05 RX ORDER — DOXYCYCLINE HYCLATE 100 MG
100 TABLET ORAL 2 TIMES DAILY
Qty: 28 TABLET | Refills: 0 | Status: SHIPPED | OUTPATIENT
Start: 2020-03-05 | End: 2022-02-21

## 2020-03-05 ASSESSMENT — MIFFLIN-ST. JEOR: SCORE: 1696.43

## 2020-03-05 NOTE — NURSING NOTE
"Chief Complaint   Patient presents with     Rash       Initial /64   Pulse 66   Temp 97.7  F (36.5  C) (Tympanic)   Resp 16   Ht 1.772 m (5' 9.75\")   Wt 83.9 kg (185 lb)   SpO2 98%   BMI 26.74 kg/m   Estimated body mass index is 26.74 kg/m  as calculated from the following:    Height as of this encounter: 1.772 m (5' 9.75\").    Weight as of this encounter: 83.9 kg (185 lb).    Patient presents to the clinic using No DME    Health Maintenance that is potentially due pending provider review:  FIT sent home 2/10/20 - reminded Pt to send in.     n/a    Is there anyone who you would like to be able to receive your results? No  If yes have patient fill out GALI    Samina Rao CMA([facility])    "

## 2020-03-05 NOTE — PROGRESS NOTES
"Talon Sanchez is a 49 year old male comes in today with the following concerns.      1. Recheck rash. Has not improved since seen 2/10/20, has spread closer to the armpits now.     Samina Roa CMA([facility])      *  S: Talon Sanchez is a 49 year old male with rash in groin, and now in armpits.    Red, sometimes a bit itchy.      Tried antifungal for a couple weeks, didn't help at all.  acutally moved to armpits as well    Not ill    O:/64   Pulse 66   Temp 97.7  F (36.5  C) (Tympanic)   Resp 16   Ht 1.772 m (5' 9.75\")   Wt 83.9 kg (185 lb)   SpO2 98%   BMI 26.74 kg/m    GEN: Alert and oriented, in no acute distress  Has well demarcated red plaque like lesions in both armpits and groin.  No scale, no active border.  No weeping.    Maybe mild fluorescence with woods lamp?    A: erythrasma    P: try doxy for 2 weeks bid.  If not improving, derm referral?  Inverse psoriasis? Stubborn tinea?  Spinning our wheels here if not improving on doxy.         "

## 2020-03-13 DIAGNOSIS — E78.5 HYPERLIPIDEMIA LDL GOAL <130: ICD-10-CM

## 2020-03-13 DIAGNOSIS — I10 ESSENTIAL HYPERTENSION: Primary | ICD-10-CM

## 2020-03-14 NOTE — TELEPHONE ENCOUNTER
"Requested Prescriptions   Pending Prescriptions Disp Refills     atorvastatin (LIPITOR) 80 MG tablet [Pharmacy Med Name: ATORVASTATIN TABS 80MG] 90 tablet 3     Sig: TAKE 1 TABLET DAILY   Last Written Prescription Date:  12/15/19  Last Fill Quantity: 90,  # refills: 3   Last office visit: 3/5/2020 with prescribing provider:  FRANCISCA Marie   Future Office Visit:        Statins Protocol Passed - 3/13/2020 11:30 PM        Passed - LDL on file in past 12 months     Recent Labs   Lab Test 03/20/19  1417   *             Passed - No abnormal creatine kinase in past 12 months     No lab results found.             Passed - Recent (12 mo) or future (30 days) visit within the authorizing provider's specialty     Patient has had an office visit with the authorizing provider or a provider within the authorizing providers department within the previous 12 mos or has a future within next 30 days. See \"Patient Info\" tab in inbasket, or \"Choose Columns\" in Meds & Orders section of the refill encounter.              Passed - Medication is active on med list        Passed - Patient is age 18 or older           lisinopril (ZESTRIL) 10 MG tablet [Pharmacy Med Name: LISINOPRIL TABS 10MG] 90 tablet 3     Sig: TAKE 1 TABLET DAILY   Last Written Prescription Date:  12/15/19  Last Fill Quantity: 90,  # refills: 3   Last office visit: 3/5/2020 with prescribing provider:  FRANCISCA Marie   Future Office Visit:        ACE Inhibitors (Including Combos) Protocol Passed - 3/13/2020 11:30 PM        Passed - Blood pressure under 140/90 in past 12 months     BP Readings from Last 3 Encounters:   03/05/20 130/64   02/10/20 (!) 130/96   03/20/19 118/82                 Passed - Recent (12 mo) or future (30 days) visit within the authorizing provider's specialty     Patient has had an office visit with the authorizing provider or a provider within the authorizing providers department within the previous 12 mos or has a future within next 30 days. See \"Patient " "Info\" tab in inbasket, or \"Choose Columns\" in Meds & Orders section of the refill encounter.              Passed - Medication is active on med list        Passed - Patient is age 18 or older        Passed - Normal serum creatinine on file in past 12 months     Recent Labs   Lab Test 02/10/20  1008   CR 1.11       Ok to refill medication if creatinine is low          Passed - Normal serum potassium on file in past 12 months     Recent Labs   Lab Test 02/10/20  1008   POTASSIUM 4.1                  "

## 2020-03-15 RX ORDER — ATORVASTATIN CALCIUM 80 MG/1
TABLET, FILM COATED ORAL
Qty: 90 TABLET | Refills: 3 | Status: SHIPPED | OUTPATIENT
Start: 2020-03-15 | End: 2021-03-10

## 2020-03-15 RX ORDER — LISINOPRIL 10 MG/1
TABLET ORAL
Qty: 90 TABLET | Refills: 3 | Status: SHIPPED | OUTPATIENT
Start: 2020-03-15 | End: 2021-03-10

## 2020-03-16 ENCOUNTER — OFFICE VISIT (OUTPATIENT)
Dept: DERMATOLOGY | Facility: CLINIC | Age: 50
End: 2020-03-16
Payer: COMMERCIAL

## 2020-03-16 VITALS — DIASTOLIC BLOOD PRESSURE: 91 MMHG | OXYGEN SATURATION: 98 % | SYSTOLIC BLOOD PRESSURE: 153 MMHG | HEART RATE: 78 BPM

## 2020-03-16 DIAGNOSIS — L40.8 INVERSE PSORIASIS: Primary | ICD-10-CM

## 2020-03-16 DIAGNOSIS — L40.9 PSORIASIS: ICD-10-CM

## 2020-03-16 PROCEDURE — 99203 OFFICE O/P NEW LOW 30 MIN: CPT | Performed by: PHYSICIAN ASSISTANT

## 2020-03-16 RX ORDER — TRIAMCINOLONE ACETONIDE 1 MG/G
CREAM TOPICAL
Qty: 454 G | Refills: 2 | Status: SHIPPED | OUTPATIENT
Start: 2020-03-16 | End: 2021-05-24

## 2020-03-16 RX ORDER — FLUOCINONIDE TOPICAL SOLUTION USP, 0.05% 0.5 MG/ML
SOLUTION TOPICAL
Qty: 50 ML | Refills: 3 | Status: SHIPPED | OUTPATIENT
Start: 2020-03-16 | End: 2023-04-13

## 2020-03-16 NOTE — PROGRESS NOTES
HPI:   Chief complaints: Talon Sanchez is a 49 year old male who presents for evaluation of a rash. The rash started in his groin and buttocks area and has now moved up to his waist and scalp and arms. He has had intermittent soreness in the buttocks in the past, but doesn't feel it has become a rash.   Condition present for:  About a month.   Previous treatments include: lotion, antifungal wash, OTC cortisone    Review Of Systems  Eyes: negative  Ears/Nose/Throat: negative  Respiratory: No shortness of breath, dyspnea on exertion, cough, or hemoptysis  Cardiovascular: negative  Gastrointestinal: negative  Genitourinary: negative  Musculoskeletal: negative  Neurologic: negative  Psychiatric: negative  Skin: positive for rash    This document serves as a record of the services and decisions personally performed and made by Kim Rodriguez, MS, PA-C. It was created on her behalf by Brittany Rojas, a trained medical scribe. The creation of this document is based on the provider's statements to the medical scribe.  Brittany Rojas 3:07 PM March 16, 2020    PHYSICAL EXAM:    BP (!) 153/91   Pulse 78   SpO2 98%   Skin exam performed as follows: Type 2 skin. Mood appropriate  Alert and Oriented X 3. Well developed, well nourished in no distress.  General appearance: Normal  Head including face: Normal  Eyes: conjunctiva and lids: Normal  Mouth: Lips, teeth, gums: Normal  Neck: Normal  Chest-breast/axillae: Normal  Back: Normal  Spleen and liver: Normal  Cardiovascular: Exam of peripheral vascular system by observation for swelling, varicosities, edema: Normal  Genitalia: groin, buttocks: Normal  Extremities: digits/nails (clubbing): Normal  Eccrine and Apocrine glands: Normal  Right upper extremity: Normal  Left upper extremity: Normal  Right lower extremity: Normal  Left lower extremity: Normal  Skin: Scalp and body hair: See below    1. psoriasorm dermatitis in the groin, waist, axillae, buttocks    ASSESSMENT/PLAN:      1. Favor psoriasis and inverse psoriasis - advised on chronic, inflammatory, autoimmune disorder.  Has tried lotion and vasaline in the past as well as numerous antifungals.   --start TAC to AAs on body BID x 1-2 weeks, then PRN only  --start lidex to scalp BID x 1-2 weeks, then PRN only   2. Talon to follow up with Primary Care provider regarding elevated blood pressure.      Follow-up: 6-8 weeks if struggling  CC:   Scribed By: Brittany Rojas, Medical Scribe    The information in this document, created by the medical scribe for me, accurately reflects the services I personally performed and the decisions made by me. I have reviewed and approved this document for accuracy prior to leaving the patient care area.  March 16, 2020 3:11 PM      Kim Rodriguez MS, PA-C

## 2020-03-16 NOTE — LETTER
3/16/2020         RE: Talon Sanchez  [address]   [facility] [address]        Dear Colleague,    Thank you for referring your patient, Talon Sanchez, to the [facility]. Please see a copy of my visit note below.    HPI:   Chief complaints: Talon Sanchez is a 49 year old male who presents for evaluation of a rash. The rash started in his groin and buttocks area and has now moved up to his waist and scalp and arms. He has had intermittent soreness in the buttocks in the past, but doesn't feel it has become a rash.   Condition present for:  About a month.   Previous treatments include: lotion, antifungal wash, OTC cortisone    Review Of Systems  Eyes: negative  Ears/Nose/Throat: negative  Respiratory: No shortness of breath, dyspnea on exertion, cough, or hemoptysis  Cardiovascular: negative  Gastrointestinal: negative  Genitourinary: negative  Musculoskeletal: negative  Neurologic: negative  Psychiatric: negative  Skin: positive for rash    This document serves as a record of the services and decisions personally performed and made by Kim Rodriguez, MS, PA-C. It was created on her behalf by Brittany Rojas, a trained medical scribe. The creation of this document is based on the provider's statements to the medical scribe.  Brittany Rojas 3:07 PM March 16, 2020    PHYSICAL EXAM:    BP (!) 153/91   Pulse 78   SpO2 98%   Skin exam performed as follows: Type 2 skin. Mood appropriate  Alert and Oriented X 3. Well developed, well nourished in no distress.  General appearance: Normal  Head including face: Normal  Eyes: conjunctiva and lids: Normal  Mouth: Lips, teeth, gums: Normal  Neck: Normal  Chest-breast/axillae: Normal  Back: Normal  Spleen and liver: Normal  Cardiovascular: Exam of peripheral vascular system by observation for swelling, varicosities, edema: Normal  Genitalia: groin, buttocks: Normal  Extremities: digits/nails (clubbing): Normal  Eccrine and Apocrine glands: Normal  Right upper  extremity: Normal  Left upper extremity: Normal  Right lower extremity: Normal  Left lower extremity: Normal  Skin: Scalp and body hair: See below    1. psoriasorm dermatitis in the groin, waist, axillae, buttocks    ASSESSMENT/PLAN:     1. Favor psoriasis and inverse psoriasis - advised on chronic, inflammatory, autoimmune disorder.  Has tried lotion and vasaline in the past as well as numerous antifungals.   --start TAC to AAs on body BID x 1-2 weeks, then PRN only  --start lidex to scalp BID x 1-2 weeks, then PRN only   2. Talon to follow up with Primary Care provider regarding elevated blood pressure.      Follow-up: 6-8 weeks if struggling  CC:   Scribed By: Brittany Rojas, Medical Scribe    The information in this document, created by the medical scribe for me, accurately reflects the services I personally performed and the decisions made by me. I have reviewed and approved this document for accuracy prior to leaving the patient care area.  March 16, 2020 3:11 PM      Kim Rodriguez MS, PA-C        Again, thank you for allowing me to participate in the care of your patient.        Sincerely,        Kim Rodriguez PA-C

## 2020-05-21 ENCOUNTER — OFFICE VISIT (OUTPATIENT)
Dept: DERMATOLOGY | Facility: CLINIC | Age: 50
End: 2020-05-21
Payer: COMMERCIAL

## 2020-05-21 VITALS — HEART RATE: 58 BPM | SYSTOLIC BLOOD PRESSURE: 147 MMHG | DIASTOLIC BLOOD PRESSURE: 82 MMHG | OXYGEN SATURATION: 98 %

## 2020-05-21 DIAGNOSIS — L40.9 PSORIASIS: Primary | ICD-10-CM

## 2020-05-21 PROCEDURE — 99213 OFFICE O/P EST LOW 20 MIN: CPT | Performed by: PHYSICIAN ASSISTANT

## 2020-05-21 NOTE — NURSING NOTE
Chief Complaint   Patient presents with     Psoriasis     fu       Vitals:    05/21/20 0945 05/21/20 0946   BP: (!) 147/82 (!) 147/82   BP Location: Left arm    Patient Position: Sitting    Cuff Size: Adult Large    Pulse: 58    SpO2: 98%      Wt Readings from Last 1 Encounters:   03/05/20 83.9 kg (185 lb)       Rochelle Field LPN.................5/21/2020

## 2020-05-21 NOTE — LETTER
5/21/2020         RE: Talon Sanchez  [address]   [facility] [address]        Dear Colleague,    Thank you for referring your patient, Talon Sanchez, to the [facility]. Please see a copy of my visit note below.    HPI:   Chief complaints: Talon Sanchez is a 49 year old male who presents for recheck of psoriasis. Has been using TAC PRN to any active areas on the body and this has been working well. Needed the scalp solution a few times only and since then his scalp has been clear.   Condition present for:  2-3 months  Previous treatments include: lotion, antifungal wash, OTC cortisone; currently using TAC and lidex PRN    Review Of Systems  Eyes: negative  Ears/Nose/Throat: negative  Respiratory: No shortness of breath, dyspnea on exertion, cough, or hemoptysis  Cardiovascular: negative  Gastrointestinal: negative  Genitourinary: negative  Musculoskeletal: negative  Neurologic: negative  Psychiatric: negative  Skin: positive for rash    HPI, past medical history, social history, allergies, medications reviewed as of May 21, 2020    PHYSICAL EXAM:    BP (!) 147/82   Pulse 58   SpO2 98%   Skin exam performed as follows: Type 2 skin. Mood appropriate  Alert and Oriented X 3. Well developed, well nourished in no distress.  General appearance: Normal  Head including face: Normal  Eyes: conjunctiva and lids: Normal  Mouth: Lips, teeth, gums: Normal  Neck: Normal  Chest-breast/axillae: Normal  Back: Normal  Spleen and liver: Normal  Cardiovascular: Exam of peripheral vascular system by observation for swelling, varicosities, edema: Normal  Genitalia: groin, buttocks: Normal  Extremities: digits/nails (clubbing): Normal  Eccrine and Apocrine glands: Normal  Right upper extremity: Normal  Left upper extremity: Normal  Right lower extremity: Normal  Left lower extremity: Normal  Skin: Scalp and body hair: See below    1. Skin clear    ASSESSMENT/PLAN:     1. Psoriasis and inverse psoriasis - much  improved on current regimen. advised on chronic, inflammatory, autoimmune disorder.     --Continue TAC to AAs on body BID x 1-2 weeks, then PRN only  --Continue lidex to scalp BID x 1-2 weeks, then PRN only   2. Talon to follow up with Primary Care provider regarding elevated blood pressure.      Follow-up: yearly/PRN sooner  CC:   Scribed By: Kim Rodriguez, MS, PABRANDT        Again, thank you for allowing me to participate in the care of your patient.        Sincerely,        Kim Rodriguez PA-C

## 2020-05-21 NOTE — PROGRESS NOTES
HPI:   Chief complaints: Talon Sanchez is a 49 year old male who presents for recheck of psoriasis. Has been using TAC PRN to any active areas on the body and this has been working well. Needed the scalp solution a few times only and since then his scalp has been clear.   Condition present for:  2-3 months  Previous treatments include: lotion, antifungal wash, OTC cortisone; currently using TAC and lidex PRN    Review Of Systems  Eyes: negative  Ears/Nose/Throat: negative  Respiratory: No shortness of breath, dyspnea on exertion, cough, or hemoptysis  Cardiovascular: negative  Gastrointestinal: negative  Genitourinary: negative  Musculoskeletal: negative  Neurologic: negative  Psychiatric: negative  Skin: positive for rash    HPI, past medical history, social history, allergies, medications reviewed as of May 21, 2020    PHYSICAL EXAM:    BP (!) 147/82   Pulse 58   SpO2 98%   Skin exam performed as follows: Type 2 skin. Mood appropriate  Alert and Oriented X 3. Well developed, well nourished in no distress.  General appearance: Normal  Head including face: Normal  Eyes: conjunctiva and lids: Normal  Mouth: Lips, teeth, gums: Normal  Neck: Normal  Chest-breast/axillae: Normal  Back: Normal  Spleen and liver: Normal  Cardiovascular: Exam of peripheral vascular system by observation for swelling, varicosities, edema: Normal  Genitalia: groin, buttocks: Normal  Extremities: digits/nails (clubbing): Normal  Eccrine and Apocrine glands: Normal  Right upper extremity: Normal  Left upper extremity: Normal  Right lower extremity: Normal  Left lower extremity: Normal  Skin: Scalp and body hair: See below    1. Skin clear    ASSESSMENT/PLAN:     1. Psoriasis and inverse psoriasis - much improved on current regimen. advised on chronic, inflammatory, autoimmune disorder.     --Continue TAC to AAs on body BID x 1-2 weeks, then PRN only  --Continue lidex to scalp BID x 1-2 weeks, then PRN only   2. Talon to follow up with Primary  Care provider regarding elevated blood pressure.      Follow-up: yearly/PRN sooner  CC:   Scribed By: Kim Rodriguez MS, PA-C

## 2020-12-06 ENCOUNTER — HEALTH MAINTENANCE LETTER (OUTPATIENT)
Age: 50
End: 2020-12-06

## 2021-03-08 DIAGNOSIS — I10 ESSENTIAL HYPERTENSION: ICD-10-CM

## 2021-03-08 DIAGNOSIS — E78.5 HYPERLIPIDEMIA LDL GOAL <130: ICD-10-CM

## 2021-03-10 RX ORDER — ATORVASTATIN CALCIUM 80 MG/1
TABLET, FILM COATED ORAL
Qty: 30 TABLET | Refills: 0 | Status: SHIPPED | OUTPATIENT
Start: 2021-03-10 | End: 2021-05-24

## 2021-03-10 RX ORDER — LISINOPRIL 10 MG/1
TABLET ORAL
Qty: 30 TABLET | Refills: 0 | Status: SHIPPED | OUTPATIENT
Start: 2021-03-10 | End: 2021-05-24

## 2021-03-22 ENCOUNTER — TELEPHONE (OUTPATIENT)
Dept: FAMILY MEDICINE | Facility: CLINIC | Age: 51
End: 2021-03-22

## 2021-03-22 NOTE — TELEPHONE ENCOUNTER
Patient Quality Outreach      Summary:    Patient has the following on his problem list/HM:     Hypertension   Last three blood pressure readings:  BP Readings from Last 3 Encounters:   05/21/20 (!) 147/82   03/16/20 (!) 153/91   03/05/20 130/64     Blood pressure: Failed    HTN Guidelines:  ? 139/89     Patient is due/failing the following:   Hypertension follow-up visit    Type of outreach:    Left message   Pt needs office visit for BP   Questions for provider review:    None                                                                                                                                     Susan Melissa MA       Chart routed to .

## 2021-05-24 ENCOUNTER — OFFICE VISIT (OUTPATIENT)
Dept: FAMILY MEDICINE | Facility: CLINIC | Age: 51
End: 2021-05-24
Payer: COMMERCIAL

## 2021-05-24 VITALS
SYSTOLIC BLOOD PRESSURE: 138 MMHG | HEART RATE: 58 BPM | BODY MASS INDEX: 24.36 KG/M2 | HEIGHT: 71 IN | RESPIRATION RATE: 12 BRPM | WEIGHT: 174 LBS | DIASTOLIC BLOOD PRESSURE: 84 MMHG

## 2021-05-24 DIAGNOSIS — I10 ESSENTIAL HYPERTENSION: Primary | ICD-10-CM

## 2021-05-24 DIAGNOSIS — E78.5 HYPERLIPIDEMIA LDL GOAL <130: ICD-10-CM

## 2021-05-24 DIAGNOSIS — L40.9 PSORIASIS: ICD-10-CM

## 2021-05-24 DIAGNOSIS — Z12.11 COLON CANCER SCREENING: ICD-10-CM

## 2021-05-24 PROBLEM — Z12.5 SCREENING FOR PROSTATE CANCER: Status: ACTIVE | Noted: 2021-05-24

## 2021-05-24 LAB
ANION GAP SERPL CALCULATED.3IONS-SCNC: 2 MMOL/L (ref 3–14)
BUN SERPL-MCNC: 14 MG/DL (ref 7–30)
CALCIUM SERPL-MCNC: 8.8 MG/DL (ref 8.5–10.1)
CHLORIDE SERPL-SCNC: 108 MMOL/L (ref 94–109)
CHOLEST SERPL-MCNC: 135 MG/DL
CO2 SERPL-SCNC: 27 MMOL/L (ref 20–32)
CREAT SERPL-MCNC: 1.07 MG/DL (ref 0.66–1.25)
GFR SERPL CREATININE-BSD FRML MDRD: 79 ML/MIN/{1.73_M2}
GLUCOSE SERPL-MCNC: 117 MG/DL (ref 70–99)
HDLC SERPL-MCNC: 56 MG/DL
LDLC SERPL CALC-MCNC: 69 MG/DL
NONHDLC SERPL-MCNC: 79 MG/DL
POTASSIUM SERPL-SCNC: 4.5 MMOL/L (ref 3.4–5.3)
SODIUM SERPL-SCNC: 137 MMOL/L (ref 133–144)
TRIGL SERPL-MCNC: 50 MG/DL

## 2021-05-24 PROCEDURE — 80061 LIPID PANEL: CPT | Performed by: FAMILY MEDICINE

## 2021-05-24 PROCEDURE — 80048 BASIC METABOLIC PNL TOTAL CA: CPT | Performed by: FAMILY MEDICINE

## 2021-05-24 PROCEDURE — 36415 COLL VENOUS BLD VENIPUNCTURE: CPT | Performed by: FAMILY MEDICINE

## 2021-05-24 PROCEDURE — 99214 OFFICE O/P EST MOD 30 MIN: CPT | Performed by: FAMILY MEDICINE

## 2021-05-24 RX ORDER — TRIAMCINOLONE ACETONIDE 1 MG/G
CREAM TOPICAL
Qty: 80 G | Refills: 2 | Status: SHIPPED | OUTPATIENT
Start: 2021-05-24 | End: 2023-03-14

## 2021-05-24 RX ORDER — LISINOPRIL 10 MG/1
10 TABLET ORAL DAILY
Qty: 90 TABLET | Refills: 1 | Status: CANCELLED | OUTPATIENT
Start: 2021-05-24

## 2021-05-24 RX ORDER — ATORVASTATIN CALCIUM 80 MG/1
80 TABLET, FILM COATED ORAL DAILY
Qty: 90 TABLET | Refills: 3 | Status: SHIPPED | OUTPATIENT
Start: 2021-05-24 | End: 2022-02-21

## 2021-05-24 RX ORDER — LISINOPRIL 20 MG/1
20 TABLET ORAL DAILY
Qty: 90 TABLET | Refills: 3 | Status: SHIPPED | OUTPATIENT
Start: 2021-05-24 | End: 2022-02-21

## 2021-05-24 ASSESSMENT — MIFFLIN-ST. JEOR: SCORE: 1661.39

## 2021-05-24 NOTE — PROGRESS NOTES
"  Htn: stable.  Fills and labs  Psoriasis: improved.  Needs some prn triamcinolone refilled  Hyperlipidemia: statin, tolerating.  fiills  CAD: stent 2014.  No cp or sob    P: fills.  Labs.  FIT test    Discussed risks and benefits of PSA screening with the patient, attempting to engage in shared decision making.  Discussed the high likelihood of further invasive evaluation if the PSA is elevated.  Discussed inability to reliably distinguish aggressive from indolent cancer on biopsy. Explained that PSA screeing and aggressive treatment for biopsy confirmed cancer may extend life in those patients harboring an aggressive form of prostate cancer, but will overtreat the overwhelming majority of men because of the high rate of indolent cancer.  We discussed that treatment of prostate cancer is not benign, and that significant side effects are a real possibility.  We discussed current USPSTF recommendations against any routine screening for prostate cancer.                 Given the above information, the patient elected not to procede with PSA screeing at this time.  He understands he can revisit the discussion at any time.           S: Talon Sanchez is a 50 year old male with     Htn: stable.  Fills and labs  Psoriasis: improved.  Needs some prn triamcinolone refilled  Hyperlipidemia: statin, tolerating.  fiills  CAD: stent 2014.  No cp or sob.  Has lost some weight, doing well    O:/84   Pulse 58   Resp 12   Ht 1.803 m (5' 11\")   Wt 78.9 kg (174 lb)   BMI 24.27 kg/m    GEN: Alert and oriented, in no acute distress  CV: RRR, no murmur  RESP: lungs clear bilaterally, good effort  EXT: no edema or lesions noted in lower extremities      "

## 2021-05-24 NOTE — NURSING NOTE
"Chief Complaint   Patient presents with     Hypertension     BP (!) 142/84   Pulse 58   Resp 12   Ht 1.803 m (5' 11\")   Wt 78.9 kg (174 lb)   BMI 24.27 kg/m   Estimated body mass index is 24.27 kg/m  as calculated from the following:    Height as of this encounter: 1.803 m (5' 11\").    Weight as of this encounter: 78.9 kg (174 lb).  Patient presents to the clinic using No DME      Health Maintenance that is potentially due pending provider review:    Health Maintenance Due   Topic Date Due     PREVENTIVE CARE VISIT  Never done     ANNUAL REVIEW OF HM ORDERS  Never done     ADVANCE CARE PLANNING  Never done     COVID-19 Vaccine (1) Never done     HIV SCREENING  Never done     HEPATITIS C SCREENING  Never done     ZOSTER IMMUNIZATION (1 of 2) Never done     DTAP/TDAP/TD IMMUNIZATION (2 - Td) 12/05/2018     COLORECTAL CANCER SCREENING  03/27/2020        n/a        "

## 2021-06-01 DIAGNOSIS — Z12.11 COLON CANCER SCREENING: ICD-10-CM

## 2021-06-01 PROCEDURE — 82274 ASSAY TEST FOR BLOOD FECAL: CPT | Performed by: FAMILY MEDICINE

## 2021-06-03 LAB — HEMOCCULT STL QL IA: NEGATIVE

## 2021-09-25 ENCOUNTER — HEALTH MAINTENANCE LETTER (OUTPATIENT)
Age: 51
End: 2021-09-25

## 2022-01-15 ENCOUNTER — HEALTH MAINTENANCE LETTER (OUTPATIENT)
Age: 52
End: 2022-01-15

## 2022-02-21 ENCOUNTER — OFFICE VISIT (OUTPATIENT)
Dept: FAMILY MEDICINE | Facility: CLINIC | Age: 52
End: 2022-02-21
Payer: COMMERCIAL

## 2022-02-21 VITALS
HEART RATE: 70 BPM | BODY MASS INDEX: 25.48 KG/M2 | SYSTOLIC BLOOD PRESSURE: 130 MMHG | TEMPERATURE: 97.8 F | RESPIRATION RATE: 18 BRPM | DIASTOLIC BLOOD PRESSURE: 82 MMHG | WEIGHT: 182 LBS | HEIGHT: 71 IN

## 2022-02-21 DIAGNOSIS — E78.5 HYPERLIPIDEMIA LDL GOAL <130: ICD-10-CM

## 2022-02-21 DIAGNOSIS — R73.09 ELEVATED GLUCOSE: ICD-10-CM

## 2022-02-21 DIAGNOSIS — H53.8 BLURRED VISION: ICD-10-CM

## 2022-02-21 DIAGNOSIS — I10 ESSENTIAL HYPERTENSION: Primary | ICD-10-CM

## 2022-02-21 DIAGNOSIS — R42 DIZZINESS: ICD-10-CM

## 2022-02-21 LAB
ANION GAP SERPL CALCULATED.3IONS-SCNC: 3 MMOL/L (ref 3–14)
BUN SERPL-MCNC: 20 MG/DL (ref 7–30)
CALCIUM SERPL-MCNC: 9 MG/DL (ref 8.5–10.1)
CHLORIDE BLD-SCNC: 106 MMOL/L (ref 94–109)
CO2 SERPL-SCNC: 28 MMOL/L (ref 20–32)
CREAT SERPL-MCNC: 1.05 MG/DL (ref 0.66–1.25)
ERYTHROCYTE [DISTWIDTH] IN BLOOD BY AUTOMATED COUNT: 12.7 % (ref 10–15)
ERYTHROCYTE [SEDIMENTATION RATE] IN BLOOD BY WESTERGREN METHOD: 4 MM/HR (ref 0–20)
GFR SERPL CREATININE-BSD FRML MDRD: 85 ML/MIN/1.73M2
GLUCOSE BLD-MCNC: 238 MG/DL (ref 70–99)
HCT VFR BLD AUTO: 44.3 % (ref 40–53)
HGB BLD-MCNC: 15.1 G/DL (ref 13.3–17.7)
MCH RBC QN AUTO: 29.5 PG (ref 26.5–33)
MCHC RBC AUTO-ENTMCNC: 34.1 G/DL (ref 31.5–36.5)
MCV RBC AUTO: 87 FL (ref 78–100)
PLATELET # BLD AUTO: 260 10E3/UL (ref 150–450)
POTASSIUM BLD-SCNC: 4.3 MMOL/L (ref 3.4–5.3)
RBC # BLD AUTO: 5.11 10E6/UL (ref 4.4–5.9)
SODIUM SERPL-SCNC: 137 MMOL/L (ref 133–144)
TROPONIN I SERPL HS-MCNC: 17 NG/L
TSH SERPL DL<=0.005 MIU/L-ACNC: 2.58 MU/L (ref 0.4–4)
WBC # BLD AUTO: 7.9 10E3/UL (ref 4–11)

## 2022-02-21 PROCEDURE — 99214 OFFICE O/P EST MOD 30 MIN: CPT | Performed by: FAMILY MEDICINE

## 2022-02-21 PROCEDURE — 36415 COLL VENOUS BLD VENIPUNCTURE: CPT | Performed by: FAMILY MEDICINE

## 2022-02-21 PROCEDURE — 80048 BASIC METABOLIC PNL TOTAL CA: CPT | Performed by: FAMILY MEDICINE

## 2022-02-21 PROCEDURE — 85027 COMPLETE CBC AUTOMATED: CPT | Performed by: FAMILY MEDICINE

## 2022-02-21 PROCEDURE — 93000 ELECTROCARDIOGRAM COMPLETE: CPT | Performed by: FAMILY MEDICINE

## 2022-02-21 PROCEDURE — 84443 ASSAY THYROID STIM HORMONE: CPT | Performed by: FAMILY MEDICINE

## 2022-02-21 PROCEDURE — 84484 ASSAY OF TROPONIN QUANT: CPT | Performed by: FAMILY MEDICINE

## 2022-02-21 PROCEDURE — 83036 HEMOGLOBIN GLYCOSYLATED A1C: CPT | Performed by: FAMILY MEDICINE

## 2022-02-21 PROCEDURE — 85652 RBC SED RATE AUTOMATED: CPT | Performed by: FAMILY MEDICINE

## 2022-02-21 RX ORDER — LISINOPRIL 20 MG/1
20 TABLET ORAL DAILY
Qty: 90 TABLET | Refills: 3 | Status: SHIPPED | OUTPATIENT
Start: 2022-02-21 | End: 2022-10-12

## 2022-02-21 RX ORDER — ATORVASTATIN CALCIUM 80 MG/1
80 TABLET, FILM COATED ORAL DAILY
Qty: 90 TABLET | Refills: 3 | Status: SHIPPED | OUTPATIENT
Start: 2022-02-21 | End: 2022-10-12

## 2022-02-21 ASSESSMENT — PAIN SCALES - GENERAL: PAINLEVEL: NO PAIN (0)

## 2022-02-21 NOTE — NURSING NOTE
"Chief Complaint   Patient presents with     Hypertension     Lipids     /82 (Cuff Size: Adult Regular)   Pulse 70   Temp 97.8  F (36.6  C) (Tympanic)   Resp 18   Ht 1.803 m (5' 11\")   Wt 82.6 kg (182 lb)   BMI 25.38 kg/m   Estimated body mass index is 25.38 kg/m  as calculated from the following:    Height as of this encounter: 1.803 m (5' 11\").    Weight as of this encounter: 82.6 kg (182 lb).  Patient presents to the clinic using No DME      Health Maintenance that is potentially due pending provider review:    Health Maintenance Due   Topic Date Due     PREVENTIVE CARE VISIT  Never done     ANNUAL REVIEW OF HM ORDERS  Never done     ADVANCE CARE PLANNING  Never done     COVID-19 Vaccine (1) Never done     HIV SCREENING  Never done     HEPATITIS C SCREENING  Never done     ZOSTER IMMUNIZATION (1 of 2) Never done     DTAP/TDAP/TD IMMUNIZATION (2 - Td or Tdap) 12/05/2018     INFLUENZA VACCINE (1) Never done                "

## 2022-02-21 NOTE — PROGRESS NOTES
Assessment & Plan     Essential hypertension  Blood pressure within target goal of less than 140/90. Lisinopril refilled  - lisinopril (ZESTRIL) 20 MG tablet; Take 1 tablet (20 mg) by mouth daily  - CBC with platelets; Future  - Basic metabolic panel  (Ca, Cl, CO2, Creat, Gluc, K, Na, BUN); Future  - TSH with free T4 reflex; Future  - TSH with free T4 reflex  - Basic metabolic panel  (Ca, Cl, CO2, Creat, Gluc, K, Na, BUN)  - CBC with platelets    Hyperlipidemia LDL goal <130  Lipitor refilled  - atorvastatin (LIPITOR) 80 MG tablet; Take 1 tablet (80 mg) by mouth daily    Dizziness  Experiencing intermittent dizziness along with blurred vision and headache, ongoing for about 2 to 3 weeks. Differential discussed in detail. ECG showed sinus rhythm, no acute ischemic changes or significant rhythm abnormality noted. Drummonds-Hallpike maneuver negative. CBC, BMP, TSH, troponin I, ESR and MRI brain ordered for further evaluation. Recommended to continue with hydration, healthy diet and current medications. Follow-up in 2 to 4 weeks or earlier if needed.  - EKG 12-lead complete w/read - Clinics  - ESR: Erythrocyte sedimentation rate; Future  - Troponin I; Future  - MR Brain w/o & w Contrast; Future  - Troponin I  - ESR: Erythrocyte sedimentation rate    Blurred vision  - MR Brain w/o & w Contrast; Future        Yousif Banegsa MD  [facility]    Yeyo Hanley is a 51 year old who presents for the following health issues     History of Present Illness       Hyperlipidemia:  He presents for follow up of hyperlipidemia.  He is taking medication to lower cholesterol. He is not having myalgia or other side effects to statin medications.    Hypertension: He presents for follow up of hypertension.  He does not check blood pressure  regularly outside of the clinic. Outside blood pressures have been over 140/90. He does not follow a low salt diet.   Reason for visit:  Periods of Dizziness  Symptom onset:   "1-2 weeks ago  Symptoms include:  Dizzy nauseous  Symptom intensity:  Moderate  Symptom progression:  Improving  Had these symptoms before:  Yes  Has tried/received treatment for these symptoms:  No  What makes it better:  Moving slow, sitting down, Dramamine    He eats 2-3 servings of fruits and vegetables daily.He consumes 2 sweetened beverage(s) daily.He exercises with enough effort to increase his heart rate 9 or less minutes per day.  He exercises with enough effort to increase his heart rate 3 or less days per week.   He is taking medications regularly.       Review of Systems   Constitutional, HEENT, cardiovascular, pulmonary, GI, , musculoskeletal, neuro, skin, endocrine and psych systems are negative, except as otherwise noted.      Objective    /82 (Cuff Size: Adult Regular)   Pulse 70   Temp 97.8  F (36.6  C) (Tympanic)   Resp 18   Ht 1.803 m (5' 11\")   Wt 82.6 kg (182 lb)   BMI 25.38 kg/m    Body mass index is 25.38 kg/m .     Physical Exam   GENERAL: healthy, alert and no distress  HEAD: PERRLA. EOMI without nystagmus. Negative Richard Hallpike. Up-to-date service normal fundus  NECK: no adenopathy, no asymmetry, masses, or scars   RESP: lungs clear to auscultation - no rales, rhonchi or wheezes  CV: regular rate and rhythm, normal S1 S2, no S3 or S4, no murmur, click or rub, no peripheral edema and peripheral pulses strong  ABDOMEN: soft, nontender, no hepatosplenomegaly, no masses and bowel sounds normal  MS: no gross musculoskeletal defects noted, no edema  CNS: Normal upper and lower extremity strength, cranial nerves II to XII intact, normal gait,            "

## 2022-02-23 ENCOUNTER — HOSPITAL ENCOUNTER (OUTPATIENT)
Dept: MRI IMAGING | Facility: CLINIC | Age: 52
Discharge: HOME | End: 2022-02-23
Attending: FAMILY MEDICINE | Admitting: FAMILY MEDICINE
Payer: COMMERCIAL

## 2022-02-23 DIAGNOSIS — R42 DIZZINESS: ICD-10-CM

## 2022-02-23 DIAGNOSIS — R73.09 ELEVATED GLUCOSE: Primary | ICD-10-CM

## 2022-02-23 DIAGNOSIS — H53.8 BLURRED VISION: ICD-10-CM

## 2022-02-23 LAB — HBA1C MFR BLD: 9.5 % (ref 0–5.6)

## 2022-02-23 PROCEDURE — 255N000002 HC RX 255 OP 636: Performed by: FAMILY MEDICINE

## 2022-02-23 PROCEDURE — A9585 GADOBUTROL INJECTION: HCPCS | Performed by: FAMILY MEDICINE

## 2022-02-23 PROCEDURE — 70553 MRI BRAIN STEM W/O & W/DYE: CPT

## 2022-02-23 RX ORDER — GADOBUTROL 604.72 MG/ML
8 INJECTION INTRAVENOUS ONCE
Status: COMPLETED | OUTPATIENT
Start: 2022-02-23 | End: 2022-02-23

## 2022-02-23 RX ADMIN — GADOBUTROL 8 ML: 604.72 INJECTION INTRAVENOUS at 09:39

## 2022-02-28 ENCOUNTER — OFFICE VISIT (OUTPATIENT)
Dept: FAMILY MEDICINE | Facility: CLINIC | Age: 52
End: 2022-02-28
Payer: COMMERCIAL

## 2022-02-28 VITALS
RESPIRATION RATE: 16 BRPM | WEIGHT: 178 LBS | BODY MASS INDEX: 24.92 KG/M2 | HEIGHT: 71 IN | SYSTOLIC BLOOD PRESSURE: 136 MMHG | OXYGEN SATURATION: 98 % | HEART RATE: 60 BPM | DIASTOLIC BLOOD PRESSURE: 88 MMHG | TEMPERATURE: 98.8 F

## 2022-02-28 DIAGNOSIS — E11.59 TYPE 2 DIABETES MELLITUS WITH OTHER CIRCULATORY COMPLICATION, WITHOUT LONG-TERM CURRENT USE OF INSULIN (H): Primary | ICD-10-CM

## 2022-02-28 PROBLEM — E11.9 DIABETES MELLITUS, TYPE 2 (H): Status: ACTIVE | Noted: 2022-02-28

## 2022-02-28 PROCEDURE — 99214 OFFICE O/P EST MOD 30 MIN: CPT | Performed by: FAMILY MEDICINE

## 2022-02-28 ASSESSMENT — PAIN SCALES - GENERAL: PAINLEVEL: NO PAIN (0)

## 2022-02-28 NOTE — PROGRESS NOTES
"S :Talon Sanchez is a 51 year old male with new DM2 diagnosis.  9.5 A1C.  Hx of CAD, no cp or sob       Had some blurry vision, dizziness at times, thirsty more.  Then got diagnosed.      Mri done of brain due to some of the sx, normal    Has made changes.  Feeling better already.   On statin, asa.  Lisionpirl.  BP fine    O:/88   Pulse 60   Temp 98.8  F (37.1  C) (Tympanic)   Resp 16   Ht 1.803 m (5' 11\")   Wt 80.7 kg (178 lb)   SpO2 98%   BMI 24.83 kg/m    GEN: Alert and oriented, in no acute distress  CV: RRR, no murmur  RESP: lungs clear bilaterally, good effort  EXT: no edema or lesions noted in lower extremities  Normal gait    A: diabetes 2, new diagnosis, hx CAD, stable        Htn, stable       Hyperlipidemia, stable    P: long visit.  Is going to try diet, recheck A1C at visit in 3 months.      Diet,exercise, weight, insulin resistance, monitoring all discussed.    30  min spent on date of encounter reviewing chart, history, physical, documenting and counseling as mentioned in this note     "

## 2022-06-20 ENCOUNTER — TELEPHONE (OUTPATIENT)
Dept: FAMILY MEDICINE | Facility: CLINIC | Age: 52
End: 2022-06-20
Payer: COMMERCIAL

## 2022-06-20 NOTE — TELEPHONE ENCOUNTER
Patient Quality Outreach    Patient is due for the following:   Diabetes -  Diabetic Follow-Up Visit    NEXT STEPS:   Schedule a office visit for diabetes    Type of outreach:    Phone, left message for patient/parent to call back.      Questions for provider review:    None     Susan Melissa MA

## 2022-06-25 ENCOUNTER — LAB (OUTPATIENT)
Dept: LAB | Facility: CLINIC | Age: 52
End: 2022-06-25
Payer: COMMERCIAL

## 2022-06-25 DIAGNOSIS — E78.5 HYPERLIPIDEMIA LDL GOAL <130: ICD-10-CM

## 2022-06-25 DIAGNOSIS — I10 ESSENTIAL HYPERTENSION: ICD-10-CM

## 2022-06-25 LAB
ANION GAP SERPL CALCULATED.3IONS-SCNC: 2 MMOL/L (ref 3–14)
BUN SERPL-MCNC: 22 MG/DL (ref 7–30)
CALCIUM SERPL-MCNC: 8.8 MG/DL (ref 8.5–10.1)
CHLORIDE BLD-SCNC: 112 MMOL/L (ref 94–109)
CHOLEST SERPL-MCNC: 119 MG/DL
CO2 SERPL-SCNC: 27 MMOL/L (ref 20–32)
CREAT SERPL-MCNC: 1.06 MG/DL (ref 0.66–1.25)
FASTING STATUS PATIENT QL REPORTED: YES
GFR SERPL CREATININE-BSD FRML MDRD: 84 ML/MIN/1.73M2
GLUCOSE BLD-MCNC: 115 MG/DL (ref 70–99)
HDLC SERPL-MCNC: 49 MG/DL
LDLC SERPL CALC-MCNC: 63 MG/DL
NONHDLC SERPL-MCNC: 70 MG/DL
POTASSIUM BLD-SCNC: 4.3 MMOL/L (ref 3.4–5.3)
SODIUM SERPL-SCNC: 141 MMOL/L (ref 133–144)
TRIGL SERPL-MCNC: 36 MG/DL

## 2022-06-25 PROCEDURE — 80061 LIPID PANEL: CPT

## 2022-06-25 PROCEDURE — 80048 BASIC METABOLIC PNL TOTAL CA: CPT

## 2022-06-25 PROCEDURE — 36415 COLL VENOUS BLD VENIPUNCTURE: CPT

## 2022-07-02 ENCOUNTER — HEALTH MAINTENANCE LETTER (OUTPATIENT)
Age: 52
End: 2022-07-02

## 2022-10-05 ENCOUNTER — TELEPHONE (OUTPATIENT)
Dept: FAMILY MEDICINE | Facility: CLINIC | Age: 52
End: 2022-10-05

## 2022-10-05 NOTE — TELEPHONE ENCOUNTER
Patient Quality Outreach    Patient is due for the following:   Diabetes -  Diabetic Follow-Up Visit  Colon Cancer Screening    Next Steps:   Schedule a office visit for diabetes     Type of outreach:    Sent letter.      Questions for provider review:    None     Susan Melissa MA

## 2022-10-05 NOTE — LETTER
October 5, 2022      Talon Sanchez  [address] [place] [facility] [address]      Your healthcare team cares about your health. To provide you with the best care,   we have reviewed your chart and based on our findings, we see that you are due to:     - DIABETES FOLLOW UP: Schedule a diabetic follow up appointment as a Office Visit. Patients with diabetes should generally see their provider every 3-6 months.    - COLON CANCER SCREENING:  Call or mychart the clinic to schedule your colonoscopy or schedule/ your FIT Test, or Cologuard test    If you have already completed these items, please contact the clinic via phone or   Mychart so your care team can review and update your records. Thank you for   choosing [facility] for your healthcare needs. For any questions,   concerns, or to schedule an appointment please contact the clinic.       Healthy Regards,      Your [facility] Care Team

## 2022-10-12 ENCOUNTER — OFFICE VISIT (OUTPATIENT)
Dept: FAMILY MEDICINE | Facility: CLINIC | Age: 52
End: 2022-10-12
Payer: COMMERCIAL

## 2022-10-12 VITALS
RESPIRATION RATE: 16 BRPM | TEMPERATURE: 97.9 F | HEART RATE: 74 BPM | BODY MASS INDEX: 24.36 KG/M2 | HEIGHT: 71 IN | DIASTOLIC BLOOD PRESSURE: 82 MMHG | WEIGHT: 174 LBS | SYSTOLIC BLOOD PRESSURE: 134 MMHG | OXYGEN SATURATION: 100 %

## 2022-10-12 DIAGNOSIS — E78.5 HYPERLIPIDEMIA LDL GOAL <130: ICD-10-CM

## 2022-10-12 DIAGNOSIS — I10 ESSENTIAL HYPERTENSION: ICD-10-CM

## 2022-10-12 DIAGNOSIS — E11.9 TYPE 2 DIABETES MELLITUS WITHOUT COMPLICATION, WITHOUT LONG-TERM CURRENT USE OF INSULIN (H): ICD-10-CM

## 2022-10-12 DIAGNOSIS — Z12.11 SCREEN FOR COLON CANCER: Primary | ICD-10-CM

## 2022-10-12 DIAGNOSIS — I25.10 CORONARY ARTERY DISEASE INVOLVING NATIVE CORONARY ARTERY OF NATIVE HEART WITHOUT ANGINA PECTORIS: ICD-10-CM

## 2022-10-12 DIAGNOSIS — I10 PRIMARY HYPERTENSION: ICD-10-CM

## 2022-10-12 DIAGNOSIS — L40.9 PSORIASIS: ICD-10-CM

## 2022-10-12 LAB
CREAT UR-MCNC: 115.2 MG/DL
HBA1C MFR BLD: 6.8 % (ref 0–5.6)
MICROALBUMIN UR-MCNC: <12 MG/L
MICROALBUMIN/CREAT UR: NORMAL MG/G{CREAT}

## 2022-10-12 PROCEDURE — 99214 OFFICE O/P EST MOD 30 MIN: CPT | Performed by: FAMILY MEDICINE

## 2022-10-12 PROCEDURE — 82043 UR ALBUMIN QUANTITATIVE: CPT | Performed by: FAMILY MEDICINE

## 2022-10-12 PROCEDURE — 83036 HEMOGLOBIN GLYCOSYLATED A1C: CPT | Performed by: FAMILY MEDICINE

## 2022-10-12 PROCEDURE — 36415 COLL VENOUS BLD VENIPUNCTURE: CPT | Performed by: FAMILY MEDICINE

## 2022-10-12 RX ORDER — ATORVASTATIN CALCIUM 80 MG/1
80 TABLET, FILM COATED ORAL DAILY
Qty: 90 TABLET | Refills: 3 | Status: SHIPPED | OUTPATIENT
Start: 2022-10-12 | End: 2023-04-03

## 2022-10-12 RX ORDER — FLUOCINONIDE 0.5 MG/G
OINTMENT TOPICAL 2 TIMES DAILY
Qty: 30 G | Refills: 11 | Status: SHIPPED | OUTPATIENT
Start: 2022-10-12 | End: 2023-04-13

## 2022-10-12 RX ORDER — LISINOPRIL 20 MG/1
20 TABLET ORAL DAILY
Qty: 90 TABLET | Refills: 3 | Status: SHIPPED | OUTPATIENT
Start: 2022-10-12 | End: 2023-04-03

## 2022-10-12 ASSESSMENT — PAIN SCALES - GENERAL: PAINLEVEL: NO PAIN (0)

## 2022-10-12 NOTE — PROGRESS NOTES
"S :Talon Sanchez is a 52 year old male with DM2.  Recheck A1C.  No meds yet.  Willing to start metformin if needed for A1C levels    Htn: stable on lisinopril.  Tolerating    Hyperlipidemia: statin.  Stable    CAd: no cp or sob.  Hx of stenting    Psoriasis: better with creams.  Needs something still for hands    Willing to do FIT test    O:/82   Pulse 74   Temp 97.9  F (36.6  C) (Tympanic)   Resp 16   Ht 1.803 m (5' 11\")   Wt 78.9 kg (174 lb)   SpO2 100%   BMI 24.27 kg/m    GEN: Alert and oriented, in no acute distress  CV: RRR, no murmur  RESP: lungs clear bilaterally, good effort  EXT: no edema or lesions noted in lower extremities  Some dry plaques on palms of hands    A: DM2.  Recheck A1C.  No meds yet.   Htn: stable on lisinopril.  Tolerating  Hyperlipidemia: statin.  Stable  CAd: no cp or sob.  Hx of stenting  Psoriasis: better with creams.     P: fills on meds, wanted them sent to different pharmacy.  Update labs.     Back in 6 mo.     Declines any shots         "

## 2023-02-27 ENCOUNTER — TELEPHONE (OUTPATIENT)
Dept: FAMILY MEDICINE | Facility: CLINIC | Age: 53
End: 2023-02-27
Payer: COMMERCIAL

## 2023-02-27 NOTE — LETTER
February 27, 2023    To  Talon Sanchez  [address] [place] [address]    Your team at [facility] cares about your health. We have reviewed your chart and based on our findings; we are making the following recommendations to better manage your health.     You are in particular need of attention regarding the following:     Schedule a DIABETIC FOLLOW UP appointment for Office Visit  you are due in april . Patients with diabetes should see their provider regularly.  Schedule a DIABETIC EYE EXAM.  This exam is done with an optometrist, annually. You can schedule this appointment with your eye doctor.  If you need a referral, please let us know.    If you have already completed these items, please contact the clinic via phone or   [facility] so your care team can review and update your records. Thank you for   choosing [facility] for your healthcare needs. For any questions,   concerns, or to schedule an appointment please contact our clinic.    Healthy Regards,      Your [facility] Care Team

## 2023-02-27 NOTE — TELEPHONE ENCOUNTER
Patient Quality Outreach    Patient is due for the following:   Diabetes -  Eye Exam and Diabetic Follow-Up Visit    Next Steps:   Schedule a office visit for Diabetes and eye exam    Type of outreach:    Sent letter.      Questions for provider review:    None     Susan Melissa MA

## 2023-03-14 ENCOUNTER — OFFICE VISIT (OUTPATIENT)
Dept: FAMILY MEDICINE | Facility: CLINIC | Age: 53
End: 2023-03-14
Payer: COMMERCIAL

## 2023-03-14 VITALS
OXYGEN SATURATION: 98 % | WEIGHT: 180 LBS | HEART RATE: 55 BPM | RESPIRATION RATE: 18 BRPM | HEIGHT: 71 IN | DIASTOLIC BLOOD PRESSURE: 84 MMHG | SYSTOLIC BLOOD PRESSURE: 134 MMHG | BODY MASS INDEX: 25.2 KG/M2 | TEMPERATURE: 97.5 F

## 2023-03-14 DIAGNOSIS — I10 PRIMARY HYPERTENSION: ICD-10-CM

## 2023-03-14 DIAGNOSIS — L40.9 PSORIASIS: ICD-10-CM

## 2023-03-14 DIAGNOSIS — Z12.11 SCREEN FOR COLON CANCER: ICD-10-CM

## 2023-03-14 DIAGNOSIS — E11.69 TYPE 2 DIABETES MELLITUS WITH OTHER SPECIFIED COMPLICATION, WITHOUT LONG-TERM CURRENT USE OF INSULIN (H): Primary | ICD-10-CM

## 2023-03-14 DIAGNOSIS — I25.9 IHD (ISCHEMIC HEART DISEASE): ICD-10-CM

## 2023-03-14 LAB
ANION GAP SERPL CALCULATED.3IONS-SCNC: 8 MMOL/L (ref 7–15)
BUN SERPL-MCNC: 17.9 MG/DL (ref 6–20)
CALCIUM SERPL-MCNC: 9.9 MG/DL (ref 8.6–10)
CHLORIDE SERPL-SCNC: 105 MMOL/L (ref 98–107)
CREAT SERPL-MCNC: 1.08 MG/DL (ref 0.67–1.17)
DEPRECATED HCO3 PLAS-SCNC: 26 MMOL/L (ref 22–29)
GFR SERPL CREATININE-BSD FRML MDRD: 82 ML/MIN/1.73M2
GLUCOSE SERPL-MCNC: 154 MG/DL (ref 70–99)
HBA1C MFR BLD: 7.5 % (ref 0–5.6)
POTASSIUM SERPL-SCNC: 5.2 MMOL/L (ref 3.4–5.3)
SODIUM SERPL-SCNC: 139 MMOL/L (ref 136–145)

## 2023-03-14 PROCEDURE — 36415 COLL VENOUS BLD VENIPUNCTURE: CPT | Performed by: FAMILY MEDICINE

## 2023-03-14 PROCEDURE — 99207 PR FOOT EXAM NO CHARGE: CPT | Performed by: FAMILY MEDICINE

## 2023-03-14 PROCEDURE — 99214 OFFICE O/P EST MOD 30 MIN: CPT | Performed by: FAMILY MEDICINE

## 2023-03-14 PROCEDURE — 80048 BASIC METABOLIC PNL TOTAL CA: CPT | Performed by: FAMILY MEDICINE

## 2023-03-14 PROCEDURE — 83036 HEMOGLOBIN GLYCOSYLATED A1C: CPT | Performed by: FAMILY MEDICINE

## 2023-03-14 RX ORDER — TRIAMCINOLONE ACETONIDE 1 MG/G
CREAM TOPICAL
Qty: 80 G | Refills: 2 | Status: SHIPPED | OUTPATIENT
Start: 2023-03-14 | End: 2023-07-13

## 2023-03-14 ASSESSMENT — PAIN SCALES - GENERAL: PAINLEVEL: NO PAIN (0)

## 2023-03-14 NOTE — NURSING NOTE
"Chief Complaint   Patient presents with     Hypertension     Diabetes     Lipids     /84 (Cuff Size: Adult Regular)   Pulse 55   Temp 97.5  F (36.4  C) (Tympanic)   Resp 18   Ht 1.803 m (5' 11\")   Wt 81.6 kg (180 lb)   SpO2 98%   BMI 25.10 kg/m   Estimated body mass index is 25.1 kg/m  as calculated from the following:    Height as of this encounter: 1.803 m (5' 11\").    Weight as of this encounter: 81.6 kg (180 lb).  Patient presents to the clinic using No DME      Health Maintenance that is potentially due pending provider review:    Health Maintenance Due   Topic Date Due     YEARLY PREVENTIVE VISIT  Never done     DIABETIC FOOT EXAM  Never done     ANNUAL REVIEW OF HM ORDERS  Never done     ADVANCE CARE PLANNING  Never done     EYE EXAM  Never done     HEPATITIS B IMMUNIZATION (1 of 3 - 3-dose series) Never done     COVID-19 Vaccine (1) Never done     Pneumococcal Vaccine: Pediatrics (0 to 5 Years) and At-Risk Patients (6 to 64 Years) (1 - PCV) Never done     HIV SCREENING  Never done     HEPATITIS C SCREENING  Never done     ZOSTER IMMUNIZATION (1 of 2) Never done     DTAP/TDAP/TD IMMUNIZATION (2 - Td or Tdap) 12/05/2018     COLORECTAL CANCER SCREENING  06/01/2022     INFLUENZA VACCINE (1) Never done     A1C  01/12/2023                "

## 2023-03-14 NOTE — PROGRESS NOTES
"  Assessment & Plan     Type 2 diabetes mellitus with other specified complication, without long-term current use of insulin (H)  Last hemoglobin A1c 6.8, consistent with well-controlled diabetes.  Diet controlled.  Patient has not been monitoring blood glucose at home, better compliance stressed.  Recommended regular exercise, healthy diet  Lab Results   Component Value Date    A1C 6.8 10/12/2022    A1C 9.5 02/21/2022    A1C 5.9 07/29/2014   - REVIEW OF HEALTH MAINTENANCE PROTOCOL ORDERS  - HEMOGLOBIN A1C; Future  - FOOT EXAM  - Basic metabolic panel  (Ca, Cl, CO2, Creat, Gluc, K, Na, BUN); Future    Primary hypertension  Blood pressure within target goal of less than 140/90.  Recommended to continue lisinopril, regular exercise and healthy diet    Screen for colon cancer  - Fecal colorectal cancer screen FIT - Future (S+30); Future    Psoriasis  Kenalog refilled and dermatology referral placed  - triamcinolone (KENALOG) 0.1 % external cream; Apply small amount BID x 1-2 week to palm of hands prn    IHD (ischemic heart disease)  Comment: Patient has not been taking aspirin, better compliance stressed.  Recommended to restart aspirin and continue Lipitor, lisinopril.  Cardiology referral placed  Plan: Adult Cardiology Eval  Referral     BMI:   Estimated body mass index is 25.1 kg/m  as calculated from the following:    Height as of this encounter: 1.803 m (5' 11\").    Weight as of this encounter: 81.6 kg (180 lb).   Weight management plan: Discussed healthy diet and exercise guidelines      Yousif Banegas MD  [facility]    Yeyo Hanley is a 52 year old, presenting for the following health issues:  Hypertension, Diabetes, and Lipids      History of Present Illness       Diabetes:   He presents for follow up of diabetes.  He is not checking blood glucose. He has no concerns regarding his diabetes at this time.  He is not experiencing numbness or burning in feet, excessive " "thirst, blurry vision, weight changes or redness, sores or blisters on feet. The patient has not had a diabetic eye exam in the last 12 months.         Hyperlipidemia:  He presents for follow up of hyperlipidemia.  He is taking medication to lower cholesterol. He is not having myalgia or other side effects to statin medications.    Hypertension: He presents for follow up of hypertension.  He does not check blood pressure  regularly outside of the clinic. Outside blood pressures have been over 140/90. He does not follow a low salt diet.     He eats 2-3 servings of fruits and vegetables daily.He consumes 2 sweetened beverage(s) daily.He exercises with enough effort to increase his heart rate 20 to 29 minutes per day.  He exercises with enough effort to increase his heart rate 3 or less days per week.   He is taking medications regularly.    Review of Systems   Constitutional, HEENT, cardiovascular, pulmonary, GI, , musculoskeletal, neuro, skin, endocrine and psych systems are negative, except as otherwise noted.      Objective    /84 (Cuff Size: Adult Regular)   Pulse 55   Temp 97.5  F (36.4  C) (Tympanic)   Resp 18   Ht 1.803 m (5' 11\")   Wt 81.6 kg (180 lb)   SpO2 98%   BMI 25.10 kg/m    Body mass index is 25.1 kg/m .  Physical Exam   GENERAL: alert and no distress  EYES: Eyes grossly normal to inspection, PERRL and conjunctivae and sclerae normal  HENT: normal cephalic/atraumatic, nose and mouth without ulcers or lesions, oropharynx clear and oral mucous membranes moist  NECK: no adenopathy, no asymmetry, masses, or scars and thyroid normal to palpation  RESP: lungs clear to auscultation - no rales, rhonchi or wheezes  CV: regular rates and rhythm, normal S1 S2, no S3 or S4 and no murmur, click or rub  ABDOMEN: soft, nontender  MS: no gross musculoskeletal defects noted, no edema  SKIN: Dry eczematous skin involving palms of hands   PSYCH: mentation appears normal, affect normal/bright  Diabetic foot " exam: Pedal pulses 3+, sensation to touch and pressure intact, no pedal edema noted      Wt Readings from Last 10 Encounters:   03/14/23 81.6 kg (180 lb)   10/12/22 78.9 kg (174 lb)   02/28/22 80.7 kg (178 lb)   02/21/22 82.6 kg (182 lb)   05/24/21 78.9 kg (174 lb)   03/05/20 83.9 kg (185 lb)   02/10/20 83.9 kg (185 lb)   03/20/19 85.7 kg (189 lb)   01/29/18 86.2 kg (190 lb)   03/29/17 83.9 kg (185 lb)

## 2023-03-15 DIAGNOSIS — E11.69 TYPE 2 DIABETES MELLITUS WITH OTHER SPECIFIED COMPLICATION, WITHOUT LONG-TERM CURRENT USE OF INSULIN (H): Primary | ICD-10-CM

## 2023-03-21 PROCEDURE — 82274 ASSAY TEST FOR BLOOD FECAL: CPT | Performed by: FAMILY MEDICINE

## 2023-03-22 LAB — HEMOCCULT STL QL IA: NEGATIVE

## 2023-04-02 DIAGNOSIS — E78.5 HYPERLIPIDEMIA LDL GOAL <130: ICD-10-CM

## 2023-04-02 DIAGNOSIS — I10 ESSENTIAL HYPERTENSION: ICD-10-CM

## 2023-04-03 RX ORDER — LISINOPRIL 20 MG/1
TABLET ORAL
Qty: 90 TABLET | Refills: 0 | Status: SHIPPED | OUTPATIENT
Start: 2023-04-03 | End: 2023-09-28

## 2023-04-03 RX ORDER — ATORVASTATIN CALCIUM 80 MG/1
TABLET, FILM COATED ORAL
Qty: 90 TABLET | Refills: 0 | Status: SHIPPED | OUTPATIENT
Start: 2023-04-03 | End: 2023-09-28

## 2023-04-12 NOTE — PROGRESS NOTES
HPI and Plan:     Reason for Consultation: Coronary artery disease, status post drug-eluting stent placement to the mid LAD in the context of a non-ST elevation myocardial infarction in July 2014.       History of Presenting Illness: This is a 52-year-old gentleman who has previously been followed by Dr. Del Toro in our cardiology clinic.  He has a history of coronary artery disease and is status post non-ST elevation myocardial infarction and drug-eluting stent placement to the LAD in 2014.  The circumflex system was free of disease, and the right coronary system demonstrated a distal 90% PL stenosis that was too small for intervention.      At his last office visit in 2015 he was doing well overall from a cardiovascular standpoint without evidence of angina or congestive heart failure.  An echocardiogram in 2014 demonstrated normal left ventricular systolic function with no significant valvular disease.    Today he presents for a cardiac reassessment with a few concerns.  He has been experiencing episodic left-sided chest discomfort that is not clearly related to exertion and appears to be brief and self-limiting.  It is not similar to the symptoms that triggered his initial revascularization in 2014.  He also, however, mentions occasional dyspnea on exertion that was more pronounced when he was shoveling snow or running.  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    He remains fully compliant with his medications.         PMH, FH, SH, Allergies and Meds: As outlined below.      Labs: A lipid panel on 6/25/2022 demonstrated total cholesterol of 119, HDL 49, LDL of 63 and triglycerides of 36.        IMPRESSION:    1.  Coronary artery disease status post non-ST elevation myocardial infarction and drug-eluting stent placement to the LAD in 2014 as described above.  Remains on aspirin 81 mg daily.  2.  Episodic left chest discomfort and dyspnea on exertion as described above.  3.  Diabetes.   Currently on metformin 500 mg p.o. twice daily.  4.  Dyslipidemia.  LDL under excellent control on atorvastatin 80 mg daily.    PLAN:    Mr. Sanchez presents today to reestablish care in the context of a previous history of coronary artery disease as outlined above.  His risk factors are well controlled, but I am concerned about his episodic chest discomfort and dyspnea on exertion.  To rule out progressive obstructive coronary artery disease, I have ordered an exercise stress echocardiogram.  The patient understands that if this demonstrates evidence of ischemia and then coronary angiography may be indicated.    It was a pleasure seeing him in consultation today.  This was a moderate complexity visit.          Zhang Chin M.D.                   CURRENT MEDICATIONS:  Current Outpatient Medications   Medication Sig Dispense Refill     aspirin EC 81 MG EC tablet Take 1 tablet (81 mg) by mouth daily 100 tablet 5     atorvastatin (LIPITOR) 80 MG tablet TAKE 1 TABLET DAILY 90 tablet 0     fluocinonide (LIDEX) 0.05 % external ointment Apply topically 2 times daily To palms of hands as needed 30 g 11     fluocinonide (LIDEX) 0.05 % external solution Apply to scalp BID x 1-2 weeks PRN 50 mL 3     lisinopril (ZESTRIL) 20 MG tablet TAKE 1 TABLET DAILY 90 tablet 0     metFORMIN (GLUCOPHAGE) 500 MG tablet Take 1 tablet (500 mg) by mouth 2 times daily (with meals) 180 tablet 1     MULTIPLE VITAMIN PO Take 1 tablet by mouth daily       terbinafine (LAMISIL) 1 % external cream Apply topically 2 times daily As needed for rash 30 g 3     triamcinolone (KENALOG) 0.1 % external cream Apply small amount BID x 1-2 week to palm of hands prn 80 g 2       ALLERGIES     Allergies   Allergen Reactions     Amlodipine      Edema       Dairy Products [Milk Protein Extract]      Dust Mites      Food Other (See Comments)     Eggs     Mold        PAST MEDICAL HISTORY:  Past Medical History:   Diagnosis Date     CAD (coronary artery disease) 8/5/2014     Stent to mid LAD 7/2014. NSTEMI with trop peak at 1.78.   plavix for a year.        HTN (hypertension) 1/25/2010     Hyperlipidemia LDL goal <130 10/31/2010       PAST SURGICAL HISTORY:  No past surgical history on file.    FAMILY HISTORY:  Family History   Problem Relation Age of Onset     Breast Cancer Mother      Allergies Mother      Arthritis Mother      Osteoporosis Mother      Diabetes Father      Hypertension Father      Heart Disease Father        SOCIAL HISTORY:  Social History     Socioeconomic History     Marital status:    Tobacco Use     Smoking status: Never     Smokeless tobacco: Never   Vaping Use     Vaping status: Never Used   Substance and Sexual Activity     Alcohol use: No     Drug use: No     Sexual activity: Yes     Partners: Female   Other Topics Concern     Parent/sibling w/ CABG, MI or angioplasty before 65F 55M? No       Review of Systems:  Skin:          Eyes:         ENT:         Respiratory:          Cardiovascular:         Gastroenterology:        Genitourinary:         Musculoskeletal:         Neurologic:         Psychiatric:         Heme/Lymph/Imm:         Endocrine:           Physical Exam:  Vitals: There were no vitals taken for this visit.    Constitutional:  cooperative        Skin:  warm and dry to the touch          Head:  normocephalic, no masses or lesions        Eyes:  pupils equal and round        Lymph:      ENT:  no pallor or cyanosis, dentition good        Neck:  JVP normal        Respiratory:  clear to auscultation         Cardiac: regular rhythm                pulses full and equal                                        GI:  abdomen soft        Extremities and Muscular Skeletal:  no edema              Neurological:  no gross motor deficits        Psych:  Alert and Oriented x 3        CC  [facility] Mauro Banegas MD  [id number] [place] [address]

## 2023-04-13 ENCOUNTER — OFFICE VISIT (OUTPATIENT)
Dept: CARDIOLOGY | Facility: CLINIC | Age: 53
End: 2023-04-13
Attending: FAMILY MEDICINE
Payer: COMMERCIAL

## 2023-04-13 VITALS
WEIGHT: 180.6 LBS | HEART RATE: 54 BPM | DIASTOLIC BLOOD PRESSURE: 73 MMHG | OXYGEN SATURATION: 97 % | SYSTOLIC BLOOD PRESSURE: 120 MMHG | BODY MASS INDEX: 25.19 KG/M2

## 2023-04-13 DIAGNOSIS — R07.89 CHEST DISCOMFORT: Primary | ICD-10-CM

## 2023-04-13 DIAGNOSIS — I25.9 IHD (ISCHEMIC HEART DISEASE): ICD-10-CM

## 2023-04-13 PROCEDURE — 99204 OFFICE O/P NEW MOD 45 MIN: CPT | Performed by: INTERNAL MEDICINE

## 2023-04-13 NOTE — LETTER
4/13/2023    Orlin Marie MD  [address] [place] [id number]    RE: Talon Sanchez       Dear Colleague,     I had the pleasure of seeing Talon Sanchez in the [facility].  HPI and Plan:     Reason for Consultation: Coronary artery disease, status post drug-eluting stent placement to the mid LAD in the context of a non-ST elevation myocardial infarction in July 2014.       History of Presenting Illness: This is a 52-year-old gentleman who has previously been followed by Dr. Del Toro in our cardiology clinic.  He has a history of coronary artery disease and is status post non-ST elevation myocardial infarction and drug-eluting stent placement to the LAD in 2014.  The circumflex system was free of disease, and the right coronary system demonstrated a distal 90% PL stenosis that was too small for intervention.      At his last office visit in 2015 he was doing well overall from a cardiovascular standpoint without evidence of angina or congestive heart failure.  An echocardiogram in 2014 demonstrated normal left ventricular systolic function with no significant valvular disease.    Today he presents for a cardiac reassessment with a few concerns.  He has been experiencing episodic left-sided chest discomfort that is not clearly related to exertion and appears to be brief and self-limiting.  It is not similar to the symptoms that triggered his initial revascularization in 2014.  He also, however, mentions occasional dyspnea on exertion that was more pronounced when he was shoveling snow or running.  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    He remains fully compliant with his medications.         PMH, FH, SH, Allergies and Meds: As outlined below.      Labs: A lipid panel on 6/25/2022 demonstrated total cholesterol of 119, HDL 49, LDL of 63 and triglycerides of 36.        IMPRESSION:    1.  Coronary artery disease status post non-ST elevation myocardial infarction and  drug-eluting stent placement to the LAD in 2014 as described above.  Remains on aspirin 81 mg daily.  2.  Episodic left chest discomfort and dyspnea on exertion as described above.  3.  Diabetes.  Currently on metformin 500 mg p.o. twice daily.  4.  Dyslipidemia.  LDL under excellent control on atorvastatin 80 mg daily.    PLAN:    Mr. Sanchez presents today to reestablish care in the context of a previous history of coronary artery disease as outlined above.  His risk factors are well controlled, but I am concerned about his episodic chest discomfort and dyspnea on exertion.  To rule out progressive obstructive coronary artery disease, I have ordered an exercise stress echocardiogram.  The patient understands that if this demonstrates evidence of ischemia and then coronary angiography may be indicated.    It was a pleasure seeing him in consultation today.  This was a moderate complexity visit.          Zhang Chin M.D.                   CURRENT MEDICATIONS:  Current Outpatient Medications   Medication Sig Dispense Refill    aspirin EC 81 MG EC tablet Take 1 tablet (81 mg) by mouth daily 100 tablet 5    atorvastatin (LIPITOR) 80 MG tablet TAKE 1 TABLET DAILY 90 tablet 0    fluocinonide (LIDEX) 0.05 % external ointment Apply topically 2 times daily To palms of hands as needed 30 g 11    fluocinonide (LIDEX) 0.05 % external solution Apply to scalp BID x 1-2 weeks PRN 50 mL 3    lisinopril (ZESTRIL) 20 MG tablet TAKE 1 TABLET DAILY 90 tablet 0    metFORMIN (GLUCOPHAGE) 500 MG tablet Take 1 tablet (500 mg) by mouth 2 times daily (with meals) 180 tablet 1    MULTIPLE VITAMIN PO Take 1 tablet by mouth daily      terbinafine (LAMISIL) 1 % external cream Apply topically 2 times daily As needed for rash 30 g 3    triamcinolone (KENALOG) 0.1 % external cream Apply small amount BID x 1-2 week to palm of hands prn 80 g 2       ALLERGIES     Allergies   Allergen Reactions    Amlodipine      Edema      Dairy Products [Milk Protein  Extract]     Dust Mites     Food Other (See Comments)     Eggs    Mold        PAST MEDICAL HISTORY:  Past Medical History:   Diagnosis Date    CAD (coronary artery disease) 8/5/2014    Stent to mid LAD 7/2014. NSTEMI with trop peak at 1.78.   plavix for a year.       HTN (hypertension) 1/25/2010    Hyperlipidemia LDL goal <130 10/31/2010       PAST SURGICAL HISTORY:  No past surgical history on file.    FAMILY HISTORY:  Family History   Problem Relation Age of Onset    Breast Cancer Mother     Allergies Mother     Arthritis Mother     Osteoporosis Mother     Diabetes Father     Hypertension Father     Heart Disease Father        SOCIAL HISTORY:  Social History     Socioeconomic History    Marital status:    Tobacco Use    Smoking status: Never    Smokeless tobacco: Never   Vaping Use    Vaping status: Never Used   Substance and Sexual Activity    Alcohol use: No    Drug use: No    Sexual activity: Yes     Partners: Female   Other Topics Concern    Parent/sibling w/ CABG, MI or angioplasty before 65F 55M? No       Review of Systems:  Skin:          Eyes:         ENT:         Respiratory:          Cardiovascular:         Gastroenterology:        Genitourinary:         Musculoskeletal:         Neurologic:         Psychiatric:         Heme/Lymph/Imm:         Endocrine:           Physical Exam:  Vitals: There were no vitals taken for this visit.    Constitutional:  cooperative        Skin:  warm and dry to the touch          Head:  normocephalic, no masses or lesions        Eyes:  pupils equal and round        Lymph:      ENT:  no pallor or cyanosis, dentition good        Neck:  JVP normal        Respiratory:  clear to auscultation         Cardiac: regular rhythm                pulses full and equal                                        GI:  abdomen soft        Extremities and Muscular Skeletal:  no edema              Neurological:  no gross motor deficits        Psych:  Alert and Oriented x 3        CC  Yousif Ur  MD [facility]  [address] [place] [id number]      Thank you for allowing me to participate in the care of your patient.      Sincerely,     Zhang Chin MD     [facility]

## 2023-04-22 ENCOUNTER — HEALTH MAINTENANCE LETTER (OUTPATIENT)
Age: 53
End: 2023-04-22

## 2023-05-18 ENCOUNTER — TELEPHONE (OUTPATIENT)
Dept: CARDIOLOGY | Facility: CLINIC | Age: 53
End: 2023-05-18

## 2023-05-18 ENCOUNTER — HOSPITAL ENCOUNTER (OUTPATIENT)
Dept: CARDIOLOGY | Facility: CLINIC | Age: 53
Discharge: HOME | End: 2023-05-18
Attending: INTERNAL MEDICINE | Admitting: INTERNAL MEDICINE
Payer: COMMERCIAL

## 2023-05-18 DIAGNOSIS — R07.9 CHEST PAIN: ICD-10-CM

## 2023-05-18 DIAGNOSIS — E78.5 HYPERLIPIDEMIA LDL GOAL <130: Primary | ICD-10-CM

## 2023-05-18 DIAGNOSIS — I10 PRIMARY HYPERTENSION: ICD-10-CM

## 2023-05-18 DIAGNOSIS — R07.89 CHEST DISCOMFORT: ICD-10-CM

## 2023-05-18 DIAGNOSIS — I25.10 CORONARY ARTERY DISEASE INVOLVING NATIVE CORONARY ARTERY OF NATIVE HEART WITHOUT ANGINA PECTORIS: ICD-10-CM

## 2023-05-18 PROCEDURE — 999N000208 ECHO STRESS ECHOCARDIOGRAM

## 2023-05-18 PROCEDURE — 93350 STRESS TTE ONLY: CPT | Mod: 26 | Performed by: INTERNAL MEDICINE

## 2023-05-18 PROCEDURE — 93321 DOPPLER ECHO F-UP/LMTD STD: CPT | Mod: 26 | Performed by: INTERNAL MEDICINE

## 2023-05-18 PROCEDURE — 255N000002 HC RX 255 OP 636: Performed by: INTERNAL MEDICINE

## 2023-05-18 PROCEDURE — 93016 CV STRESS TEST SUPVJ ONLY: CPT | Performed by: INTERNAL MEDICINE

## 2023-05-18 PROCEDURE — 93325 DOPPLER ECHO COLOR FLOW MAPG: CPT | Mod: 26 | Performed by: INTERNAL MEDICINE

## 2023-05-18 PROCEDURE — 93321 DOPPLER ECHO F-UP/LMTD STD: CPT | Mod: TC

## 2023-05-18 PROCEDURE — 93018 CV STRESS TEST I&R ONLY: CPT | Performed by: INTERNAL MEDICINE

## 2023-05-18 RX ADMIN — HUMAN ALBUMIN MICROSPHERES AND PERFLUTREN 2 ML: 10; .22 INJECTION, SOLUTION INTRAVENOUS at 09:07

## 2023-05-18 NOTE — TELEPHONE ENCOUNTER
"Routing stress echo to Dr. Chin-     Hx CAD s/p NSTEMI 2014. Per last clinic note - He has been experiencing episodic left-sided chest discomfort that is not clearly related to exertion and appears to be brief and self-limiting.  It is not similar to the symptoms that triggered his initial revascularization in 2014.  He also, however, mentions occasional dyspnea on exertion that was more pronounced when he was shoveling snow or running.  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  To rule out progressive obstructive coronary artery disease, I have ordered an exercise stress echocardiogram\"    Stress echo normal showing no evidence of stress-induced ischemia.    Any further recommendation based on results.     Debra Marcus RN  "

## 2023-05-18 NOTE — TELEPHONE ENCOUNTER
Thanks. I would check in and make sure his symptoms are not predictable with exertion or worsening. If not, ARNOLDO FUP in 3 months. Thanks.

## 2023-05-19 NOTE — TELEPHONE ENCOUNTER
Pt called back to discuss. Pt states he is not having any chest pain. Declines to schedule 3 month ARNOLDO follow up now. ARNOLDO order placed. Will have scheduling call him in 3 months to schedule. Naila Trotter RN Cardiology May 19, 2023, 10:43 AM

## 2023-07-13 ENCOUNTER — OFFICE VISIT (OUTPATIENT)
Dept: DERMATOLOGY | Facility: CLINIC | Age: 53
End: 2023-07-13
Payer: COMMERCIAL

## 2023-07-13 DIAGNOSIS — L40.9 PSORIASIS: ICD-10-CM

## 2023-07-13 PROCEDURE — 99204 OFFICE O/P NEW MOD 45 MIN: CPT | Performed by: PHYSICIAN ASSISTANT

## 2023-07-13 RX ORDER — TAPINAROF 10 MG/1000MG
1 CREAM TOPICAL DAILY
Qty: 60 G | Refills: 11 | Status: SHIPPED | OUTPATIENT
Start: 2023-07-13

## 2023-07-13 RX ORDER — TRIAMCINOLONE ACETONIDE 1 MG/G
CREAM TOPICAL
Qty: 80 G | Refills: 2 | Status: SHIPPED | OUTPATIENT
Start: 2023-07-13

## 2023-07-13 ASSESSMENT — PAIN SCALES - GENERAL: PAINLEVEL: MILD PAIN (2)

## 2023-07-13 NOTE — LETTER
7/13/2023         RE: Talon Sanchez  [address]   [facility] [address]        Dear Colleague,    Thank you for referring your patient, Talon Sanchez, to the [facility]. Please see a copy of my visit note below.    HPI:   Chief complaints: Talon Sanchez is a pleasant 52 year old male who presents for recheck psoriasis. He has psoriasis in the groin, armpits and abdomen as well as both palms. TAC cream has been helping for the fold areas but not for the palms. His PCP prescribed lidex for his palms but this isn't helping either.       PHYSICAL EXAM:    There were no vitals taken for this visit.  Skin exam performed as follows: Type 2 skin. Mood appropriate  Alert and Oriented X 3. Well developed, well nourished in no distress.  General appearance: Normal  Head including face: Normal  Eyes: conjunctiva and lids: Normal  Mouth: Lips, teeth, gums: Normal  Neck: Normal  Skin: Scalp and body hair: See below    Psoriasiform dermatitis on bilateral palms with fissuring    ASSESSMENT/PLAN:     Inverse psoriasis, palmar psoriasis - discussed trial of stronger topical steroids vs NBUVB vs methotrexate vs ILK. He prefers to start with Vtama.     --Start Vtama daily until clear then PRN thereafter           Follow-up: 4-5 months if needed/yearly if doing well  CC:   Scribed By: Kim Rodriguez, MS, PAMercyC      Again, thank you for allowing me to participate in the care of your patient.        Sincerely,        Kim Rodriguez PA-C

## 2023-07-13 NOTE — PROGRESS NOTES
HPI:   Chief complaints: Talon Sanchez is a pleasant 52 year old male who presents for recheck psoriasis. He has psoriasis in the groin, armpits and abdomen as well as both palms. TAC cream has been helping for the fold areas but not for the palms. His PCP prescribed lidex for his palms but this isn't helping either.       PHYSICAL EXAM:    There were no vitals taken for this visit.  Skin exam performed as follows: Type 2 skin. Mood appropriate  Alert and Oriented X 3. Well developed, well nourished in no distress.  General appearance: Normal  Head including face: Normal  Eyes: conjunctiva and lids: Normal  Mouth: Lips, teeth, gums: Normal  Neck: Normal  Skin: Scalp and body hair: See below    Psoriasiform dermatitis on bilateral palms with fissuring    ASSESSMENT/PLAN:     Inverse psoriasis, palmar psoriasis - discussed trial of stronger topical steroids vs NBUVB vs methotrexate vs ILK. He prefers to start with Vtama.     --Start Vtama daily until clear then PRN thereafter           Follow-up: 4-5 months if needed/yearly if doing well  CC:   Scribed By: Kim Rodriguez, MS, PA-C

## 2023-07-15 ENCOUNTER — HEALTH MAINTENANCE LETTER (OUTPATIENT)
Age: 53
End: 2023-07-15

## 2023-07-31 ENCOUNTER — TRANSFERRED RECORDS (OUTPATIENT)
Dept: HEALTH INFORMATION MANAGEMENT | Facility: CLINIC | Age: 53
End: 2023-07-31
Payer: COMMERCIAL

## 2023-07-31 LAB
RETINOPATHY: NEGATIVE
RETINOPATHY: NEGATIVE

## 2023-08-01 DIAGNOSIS — E11.69 TYPE 2 DIABETES MELLITUS WITH OTHER SPECIFIED COMPLICATION, WITHOUT LONG-TERM CURRENT USE OF INSULIN (H): Primary | ICD-10-CM

## 2023-08-16 ENCOUNTER — TELEPHONE (OUTPATIENT)
Dept: DERMATOLOGY | Facility: CLINIC | Age: 53
End: 2023-08-16
Payer: COMMERCIAL

## 2023-08-16 NOTE — TELEPHONE ENCOUNTER
Patient walked into clinic and stated that the cream that was prescribed for his psoriasis was not working. He would like to try methotrexate that was discussed at his last appointment. Does he need to be seen or can this be sent in for him?    Thank you,  Suze Yang MA

## 2023-08-18 DIAGNOSIS — L40.9 PSORIASIS: ICD-10-CM

## 2023-08-18 DIAGNOSIS — Z51.81 THERAPEUTIC DRUG MONITORING: Primary | ICD-10-CM

## 2023-08-18 RX ORDER — METHOTREXATE 2.5 MG/1
TABLET ORAL
Qty: 28 TABLET | Refills: 0 | Status: SHIPPED | OUTPATIENT
Start: 2023-08-18 | End: 2023-10-20

## 2023-08-18 RX ORDER — FOLIC ACID 1 MG/1
TABLET ORAL
Qty: 90 TABLET | Refills: 3 | Status: SHIPPED | OUTPATIENT
Start: 2023-08-18 | End: 2024-04-10

## 2023-08-18 NOTE — TELEPHONE ENCOUNTER
[employer] message sent with note from Kim.     Kalyn THOMASON RN BSN  [facility]  [phone]

## 2023-08-18 NOTE — PROGRESS NOTES
He can start methotrexate - needs follow-up in 1 month    Labs prior to starting    Labs again before 2nd dose - orders are in for this    Take folic acid on all other day     Methotrexate is a medication used in low doses to treat inflammatory skin conditions such as psoriasis and eczema/dermatitis. It is also prescribed for rheumatoid arthritis, psoriatic arthritis, and increasingly, other inflammatory and autoimmune disorders (off-label). In much higher doses, it is used as a chemotherapy agent for leukemia and some other forms of cancer.    For responding skin diseases, methotrexate usually shows some benefit within 6 to 8 weeks. Maximum effects are generally achieved within 5 to 6 months, depending on dose escalation.    Side effects of methotrexate  Side effects can occur at any time during treatment with methotrexate, but are most common in the first few weeks. Folic acid supplements, is thought to reduce some of the side effects of methotrexate.     If the side effects described below or other problems trouble you, or should you develop any signs of infection or unusual bleeding, notify your doctor promptly and before your next dose of methotrexate is due.      The most common side effects of methotrexate are loss of appetite, nausea and diarrhea, and affect about one in 12 patients. These side effects are usually temporary, but changes in dose and/or supplemental folic acid tablets may be helpful.    An overdose of methotrexate or deficiency of the vitamin folic acid may result in anemia , reduced white cell count, risking serious infections, and low platelet count, resulting in bruising and bleeding.    Methotrexate is stored by the liver. Transaminase liver enzyme levels may rise for a few days after treatment but they quickly return to normal.     Long term therapy may be associated with scarring (fibrosis or cirrhosis) of the liver. This is more commonly due to other reasons such as fatty liver,  diabetes, hyperlipidemia, and obesity (ie metabolic syndrome), but can also develop from viral hepatitis and alcohol.    Methotrexate can rarely cause a lung reaction similar to pneumonia called acute pneumonitis or interstitial pneumonia.      Today  -  Some baseline laboratory tests will be checked  - A prescription for folic acid will be sent to pharmacy for you to start.  (This is a vitamin that can help reduce the side effects of methotrexate)  -  A test dose of the medication to the pharmacy   -  Take all of the pills in the methotrexate prescription on the same day  -  Recheck your labs at any [facility] 7 days from the date you take the test dose  -  Once your blood work is complete, our office we will call and let you know if you can continue methotrexate

## 2023-08-18 NOTE — CONFIDENTIAL NOTE
He can start methotrexate - needs follow-up in 1 month    Labs prior to starting    Labs again before 2nd dose - orders are in for this    Take folic acid on all other day     Methotrexate is a medication used in low doses to treat inflammatory skin conditions such as psoriasis and eczema/dermatitis. It is also prescribed for rheumatoid arthritis, psoriatic arthritis, and increasingly, other inflammatory and autoimmune disorders (off-label). In much higher doses, it is used as a chemotherapy agent for leukemia and some other forms of cancer.    For responding skin diseases, methotrexate usually shows some benefit within 6 to 8 weeks. Maximum effects are generally achieved within 5 to 6 months, depending on dose escalation.    Side effects of methotrexate  Side effects can occur at any time during treatment with methotrexate, but are most common in the first few weeks. Folic acid supplements, is thought to reduce some of the side effects of methotrexate.     If the side effects described below or other problems trouble you, or should you develop any signs of infection or unusual bleeding, notify your doctor promptly and before your next dose of methotrexate is due.      The most common side effects of methotrexate are loss of appetite, nausea and diarrhea, and affect about one in 12 patients. These side effects are usually temporary, but changes in dose and/or supplemental folic acid tablets may be helpful.    An overdose of methotrexate or deficiency of the vitamin folic acid may result in anemia , reduced white cell count, risking serious infections, and low platelet count, resulting in bruising and bleeding.    Methotrexate is stored by the liver. Transaminase liver enzyme levels may rise for a few days after treatment but they quickly return to normal.     Long term therapy may be associated with scarring (fibrosis or cirrhosis) of the liver. This is more commonly due to other reasons such as fatty liver,  diabetes, hyperlipidemia, and obesity (ie metabolic syndrome), but can also develop from viral hepatitis and alcohol.    Methotrexate can rarely cause a lung reaction similar to pneumonia called acute pneumonitis or interstitial pneumonia.      Today  -  Some baseline laboratory tests will be checked  - A prescription for folic acid will be sent to pharmacy for you to start.  (This is a vitamin that can help reduce the side effects of methotrexate)  -  A test dose of the medication to the pharmacy   -  Take all of the pills in the methotrexate prescription on the same day  -  Recheck your labs at any [facility] 7 days from the date you take the test dose  -  Once your blood work is complete, our office we will call and let you know if you can continue methotrexate

## 2023-09-28 ENCOUNTER — OFFICE VISIT (OUTPATIENT)
Dept: FAMILY MEDICINE | Facility: CLINIC | Age: 53
End: 2023-09-28
Payer: COMMERCIAL

## 2023-09-28 ENCOUNTER — TELEPHONE (OUTPATIENT)
Dept: FAMILY MEDICINE | Facility: CLINIC | Age: 53
End: 2023-09-28

## 2023-09-28 VITALS
SYSTOLIC BLOOD PRESSURE: 110 MMHG | DIASTOLIC BLOOD PRESSURE: 70 MMHG | RESPIRATION RATE: 16 BRPM | HEIGHT: 71 IN | OXYGEN SATURATION: 99 % | BODY MASS INDEX: 24.3 KG/M2 | WEIGHT: 173.6 LBS | HEART RATE: 63 BPM | TEMPERATURE: 97.3 F

## 2023-09-28 DIAGNOSIS — I10 ESSENTIAL HYPERTENSION: ICD-10-CM

## 2023-09-28 DIAGNOSIS — Z00.00 ROUTINE GENERAL MEDICAL EXAMINATION AT A HEALTH CARE FACILITY: ICD-10-CM

## 2023-09-28 DIAGNOSIS — H53.8 BLURRED VISION: ICD-10-CM

## 2023-09-28 DIAGNOSIS — E78.5 DYSLIPIDEMIA: ICD-10-CM

## 2023-09-28 DIAGNOSIS — I25.10 CORONARY ARTERY DISEASE INVOLVING NATIVE CORONARY ARTERY OF NATIVE HEART WITHOUT ANGINA PECTORIS: ICD-10-CM

## 2023-09-28 DIAGNOSIS — E11.69 TYPE 2 DIABETES MELLITUS WITH OTHER SPECIFIED COMPLICATION, WITHOUT LONG-TERM CURRENT USE OF INSULIN (H): Primary | ICD-10-CM

## 2023-09-28 LAB
CHOLEST SERPL-MCNC: 114 MG/DL
CREAT UR-MCNC: 163.6 MG/DL
HBA1C MFR BLD: 7.8 % (ref 0–5.6)
HDLC SERPL-MCNC: 40 MG/DL
LDLC SERPL CALC-MCNC: 64 MG/DL
MICROALBUMIN UR-MCNC: <12 MG/L
MICROALBUMIN/CREAT UR: NORMAL MG/G{CREAT}
NONHDLC SERPL-MCNC: 74 MG/DL
TRIGL SERPL-MCNC: 52 MG/DL

## 2023-09-28 PROCEDURE — 80061 LIPID PANEL: CPT | Performed by: PHYSICIAN ASSISTANT

## 2023-09-28 PROCEDURE — 99396 PREV VISIT EST AGE 40-64: CPT | Performed by: PHYSICIAN ASSISTANT

## 2023-09-28 PROCEDURE — 83036 HEMOGLOBIN GLYCOSYLATED A1C: CPT | Performed by: PHYSICIAN ASSISTANT

## 2023-09-28 PROCEDURE — 82043 UR ALBUMIN QUANTITATIVE: CPT | Performed by: PHYSICIAN ASSISTANT

## 2023-09-28 PROCEDURE — 82570 ASSAY OF URINE CREATININE: CPT | Performed by: PHYSICIAN ASSISTANT

## 2023-09-28 PROCEDURE — 99214 OFFICE O/P EST MOD 30 MIN: CPT | Mod: 25 | Performed by: PHYSICIAN ASSISTANT

## 2023-09-28 PROCEDURE — 36415 COLL VENOUS BLD VENIPUNCTURE: CPT | Performed by: PHYSICIAN ASSISTANT

## 2023-09-28 RX ORDER — LISINOPRIL 20 MG/1
20 TABLET ORAL DAILY
Qty: 90 TABLET | Refills: 1 | Status: SHIPPED | OUTPATIENT
Start: 2023-09-28 | End: 2023-11-02

## 2023-09-28 RX ORDER — METFORMIN HCL 500 MG
TABLET, EXTENDED RELEASE 24 HR ORAL
Qty: 180 TABLET | Refills: 1 | Status: SHIPPED | OUTPATIENT
Start: 2023-09-28 | End: 2023-10-03

## 2023-09-28 RX ORDER — ATORVASTATIN CALCIUM 80 MG/1
80 TABLET, FILM COATED ORAL DAILY
Qty: 90 TABLET | Refills: 1 | Status: SHIPPED | OUTPATIENT
Start: 2023-09-28 | End: 2023-11-02

## 2023-09-28 ASSESSMENT — ENCOUNTER SYMPTOMS
NAUSEA: 0
PALPITATIONS: 0
FREQUENCY: 0
DIZZINESS: 0
DIARRHEA: 0
EYE PAIN: 0
HEMATURIA: 0
NERVOUS/ANXIOUS: 0
HEMATOCHEZIA: 0
ARTHRALGIAS: 0
SORE THROAT: 0
HEARTBURN: 0
JOINT SWELLING: 0
COUGH: 0
CONSTIPATION: 0
WEAKNESS: 0
ABDOMINAL PAIN: 0
DYSURIA: 0
MYALGIAS: 0
PARESTHESIAS: 0
CHILLS: 0
SHORTNESS OF BREATH: 0
HEADACHES: 0
FEVER: 0

## 2023-09-28 ASSESSMENT — PAIN SCALES - GENERAL: PAINLEVEL: NO PAIN (0)

## 2023-09-28 NOTE — PATIENT INSTRUCTIONS
Talk to dermatology about skin itchiness and meds not seeming to help  Increase metformin to 2 tabs daily  If get any blurry vision, test sugars.  If normal we can do further work up.    Sugar goals:  Under 130-140 when checking before breakfast  Under 180 other times  Call me if sugars being consistently high    BP goal under 130/80    Recheck 3-6 months      Preventive Health Recommendations  Male Ages 50 - 64    Yearly exam:             See your health care provider every year in order to  o   Review health changes.   o   Discuss preventive care.    o   Review your medicines if your doctor has prescribed any.   Have a cholesterol test every 5 years, or more frequently if you are at risk for high cholesterol/heart disease.   Have a diabetes test (fasting glucose) every three years. If you are at risk for diabetes, you should have this test more often.   Have a colonoscopy at age 50, or have a yearly FIT test (stool test). These exams will check for colon cancer.    Talk with your health care provider about whether or not a prostate cancer screening test (PSA) is right for you.  You should be tested each year for STDs (sexually transmitted diseases), if you re at risk.     Shots: Get a flu shot each year. Get a tetanus shot every 10 years.     Nutrition:  Eat at least 5 servings of fruits and vegetables daily.   Eat whole-grain bread, whole-wheat pasta and brown rice instead of white grains and rice.   Get adequate Calcium and Vitamin D.     Lifestyle  Exercise for at least 150 minutes a week (30 minutes a day, 5 days a week). This will help you control your weight and prevent disease.   Limit alcohol to one drink per day.   No smoking.   Wear sunscreen to prevent skin cancer.   See your dentist every six months for an exam and cleaning.   See your eye doctor every 1 to 2 years.

## 2023-09-28 NOTE — PROGRESS NOTES
SUBJECTIVE:   CC: Talon is an 53 year old who presents for preventative health visit.       9/28/2023     9:54 AM   Additional Questions   Roomed by julian krueger cma     Healthy Habits:     Getting at least 3 servings of Calcium per day:  Yes    Bi-annual eye exam:  Yes    Dental care twice a year:  Yes    Sleep apnea or symptoms of sleep apnea:  None    Diet:  Low salt and Diabetic    Frequency of exercise:  1 day/week    Duration of exercise:  Less than 15 minutes    Taking medications regularly:  Yes    Medication side effects:  None    Additional concerns today:  No    Diabetes Follow-up  How often are you checking your blood sugar? A few times a month  What time of day are you checking your blood sugars (select all that apply)?  Before meals  Have you had any blood sugars above 200?  No  Have you had any blood sugars below 70?  No  What symptoms do you notice when your blood sugar is low?  Blurred vision  What concerns do you have today about your diabetes? Vision    Do you have any of these symptoms? (Select all that apply)  Blurry vision    A1c today 7.8.  No side effects with metformin - takes 500 mg once daily.  Not tons of fruits/veggies, in part due to trying to avoid carbs last few weeks.  More meat, popcorn, peanuts.  Had a scare a a few weeks ago of 10 min of blurred vision.  Did not test sugars at the time.  Had this same thing happen once before, in 2022 when A1c >9.  Occupation baking - up and about.  Was doing treadmill for awhile.      Hyperlipidemia Follow-Up  Are you regularly taking any medication or supplement to lower your cholesterol?   Yes- lip[itor   Are you having muscle aches or other side effects that you think could be caused by your cholesterol lowering medication?  No    Hypertension Follow-up  Do you check your blood pressure regularly outside of the clinic? No   Are you following a low salt diet? No  Are your blood pressures ever more than 140 on the top number (systolic) OR more   than  90 on the bottom number (diastolic), for example 140/90? No    Checks very occasionally.  Doesn't remember systolic.  Diastolic 70s-low 80s.  No lightheadedness or dizziness.  No chest pains, chest pressures, SOB or sudden change of endurance.       Psoriasis - not taking methotrexate, not taking folic acid.  Using tapinarof, not seeming helpful.    BP Readings from Last 2 Encounters:   09/28/23 110/70   04/13/23 120/73     Hemoglobin A1C (%)   Date Value   09/28/2023 7.8 (H)   03/14/2023 7.5 (H)   07/29/2014 5.9     LDL Cholesterol Calculated (mg/dL)   Date Value   06/25/2022 63   05/24/2021 69   03/20/2019 108 (H)     Have you ever done Advance Care Planning? (For example, a Health Directive, POLST, or a discussion with a medical provider or your loved ones about your wishes): No, advance care planning information given to patient to review.  Patient plans to discuss their wishes with loved ones or provider.      Social History     Tobacco Use    Smoking status: Never     Passive exposure: Never    Smokeless tobacco: Never   Substance Use Topics    Alcohol use: No         9/28/2023     9:41 AM   Alcohol Use   Prescreen: >3 drinks/day or >7 drinks/week? No     Last PSA: No results found for: PSA    Reviewed orders with patient. Reviewed health maintenance and updated orders accordingly - Yes  Labs reviewed in EPIC  BP Readings from Last 3 Encounters:   09/28/23 110/70   04/13/23 120/73   03/14/23 134/84    Wt Readings from Last 3 Encounters:   09/28/23 78.7 kg (173 lb 9.6 oz)   04/13/23 81.9 kg (180 lb 9.6 oz)   03/14/23 81.6 kg (180 lb)         Reviewed and updated as needed this visit by clinical staff   Tobacco  Allergies  Meds              Reviewed and updated as needed this visit by Provider                   Review of Systems   Constitutional:  Negative for chills and fever.   HENT:  Negative for congestion, ear pain, hearing loss and sore throat.    Eyes:  Negative for pain and visual disturbance.  "  Respiratory:  Negative for cough and shortness of breath.    Cardiovascular:  Negative for chest pain, palpitations and peripheral edema.   Gastrointestinal:  Negative for abdominal pain, constipation, diarrhea, heartburn, hematochezia and nausea.   Genitourinary:  Negative for dysuria, frequency, genital sores, hematuria, impotence, penile discharge and urgency.   Musculoskeletal:  Negative for arthralgias, joint swelling and myalgias.   Skin:  Negative for rash.   Neurological:  Negative for dizziness, weakness, headaches and paresthesias.   Psychiatric/Behavioral:  Negative for mood changes. The patient is not nervous/anxious.      OBJECTIVE:   /70   Pulse 63   Temp 97.3  F (36.3  C) (Tympanic)   Resp 16   Ht 1.803 m (5' 10.98\")   Wt 78.7 kg (173 lb 9.6 oz)   SpO2 99%   BMI 24.22 kg/m      Physical Exam  GENERAL: healthy, alert and no distress  EYES: Eyes grossly normal to inspection, PERRL and conjunctivae and sclerae normal  HENT: ear canals and TM's normal, nose and mouth without ulcers or lesions  NECK: no adenopathy, no asymmetry, masses, or scars and thyroid normal to palpation  RESP: lungs clear to auscultation - no rales, rhonchi or wheezes  CV: regular rate and rhythm, normal S1 S2, no S3 or S4, no murmur, click or rub, no peripheral edema and peripheral pulses strong  ABDOMEN: soft, nontender, no hepatosplenomegaly, no masses and bowel sounds normal  MS: no gross musculoskeletal defects noted, no edema  SKIN: no suspicious lesions or rashes  NEURO: Normal strength and tone, mentation intact and speech normal  PSYCH: mentation appears normal, affect normal/bright  : patient declines    Diagnostic Test Results:  Labs reviewed in Epic    ASSESSMENT/PLAN:   (E11.69) Type 2 diabetes mellitus with other specified complication, without long-term current use of insulin (H)  (primary encounter diagnosis)  Comment: controlled but would benefit from tighter control of sugars, switch from metformin " "IR which he remembers once a day to ER so we can increase dose  Plan: Albumin Random Urine Quantitative with Creat         Ratio, Hemoglobin A1c, Lipid panel reflex to         direct LDL Non-fasting, metFORMIN (GLUCOPHAGE         XR) 500 MG 24 hr tablet    (I10) Essential hypertension  Comment: controlled, refill  Plan: lisinopril (ZESTRIL) 20 MG tablet      (E78.5) Dyslipidemia  Comment: controlled, refill  Plan: atorvastatin (LIPITOR) 80 MG tablet    (I25.10) Coronary artery disease involving native coronary artery of native heart without angina pectoris  Comment: asymptomatic and reassuring stress echo this spring  Plan: continue with cardiology    (H53.8) Blurred vision  Comment: had just seen eye doctor shortly before that happened.  No recurrence in the 3 wks since it happened.  Plan: monitor for recurrence, work up further if recurs    (Z00.00) Routine general medical examination at a health care facility      COUNSELING:   Reviewed preventive health counseling, as reflected in patient instructions    BMI:   Estimated body mass index is 24.22 kg/m  as calculated from the following:    Height as of this encounter: 1.803 m (5' 10.98\").    Weight as of this encounter: 78.7 kg (173 lb 9.6 oz).     He reports that he has never smoked. He has never been exposed to tobacco smoke. He has never used smokeless tobacco.          Mara Moncada PA-C  [facility]  "

## 2023-09-28 NOTE — NURSING NOTE
"Chief Complaint   Patient presents with    Physical       Initial There were no vitals taken for this visit. Estimated body mass index is 25.19 kg/m  as calculated from the following:    Height as of 3/14/23: 1.803 m (5' 11\").    Weight as of 4/13/23: 81.9 kg (180 lb 9.6 oz).    Patient presents to the clinic using No DME    Is there anyone who you would like to be able to receive your results? No  If yes have patient fill out GALI      "

## 2023-09-28 NOTE — TELEPHONE ENCOUNTER
Forms Request    Type of form/letter:    [employer] - 2024 Phy certification Form     Form received back signed  9/28/23  Faxed Back & mailed copy to Pt & Sent to be scanned to this encounter  Apoorva [place] Sec

## 2023-09-29 NOTE — RESULT ENCOUNTER NOTE
Talon  Cholesterol looks good.  Urine protein test normal.  Let me know if you have questions.  Mara

## 2023-10-03 ENCOUNTER — MYC MEDICAL ADVICE (OUTPATIENT)
Dept: FAMILY MEDICINE | Facility: CLINIC | Age: 53
End: 2023-10-03
Payer: COMMERCIAL

## 2023-10-03 DIAGNOSIS — E11.69 TYPE 2 DIABETES MELLITUS WITH OTHER SPECIFIED COMPLICATION, WITHOUT LONG-TERM CURRENT USE OF INSULIN (H): ICD-10-CM

## 2023-10-03 RX ORDER — METFORMIN HCL 500 MG
TABLET, EXTENDED RELEASE 24 HR ORAL
Qty: 180 TABLET | Refills: 1 | Status: SHIPPED | OUTPATIENT
Start: 2023-10-03 | End: 2024-04-02

## 2023-10-03 NOTE — TELEPHONE ENCOUNTER
Talon is no longer using mail order   Thanks   Staci Allison [facility]  [phone]

## 2023-10-20 DIAGNOSIS — L40.9 PSORIASIS: ICD-10-CM

## 2023-10-20 RX ORDER — METHOTREXATE 2.5 MG/1
TABLET ORAL
Qty: 60 TABLET | Refills: 0 | Status: SHIPPED | OUTPATIENT
Start: 2023-10-20 | End: 2024-10-04

## 2023-10-20 NOTE — TELEPHONE ENCOUNTER
Called patient as he never had his labs done or did a test dose of methotrexate and 7 day post med labs. Pt stated he just followed the directions on the bottle and did not know he was supposed to do any labs at all. Advised pt to get labs done and provider would be notified to refill as pt is out of med at this time. Mychart was sent previously to patient and he does not use mychart.  Please advise and refill for pt. He stated he will get labs asap. He is taking the Folic Acid and is taking that as well.   Nicole MARTE RN BSN PHN  [facility]

## 2023-10-20 NOTE — TELEPHONE ENCOUNTER
Requested Prescriptions   Pending Prescriptions Disp Refills    methotrexate sodium 2.5 MG TABS 28 tablet 0     Sig: 3 tablets all at once week 1 then increase to 5 tablets all at once one time per week starting week 2       There is no refill protocol information for this order        Last Written Prescription Date:  8/18/23  Last Fill Quantity: 28,  # refills: 0   Last office visit: 7/13/2023 ; last virtual visit: Visit date not found with prescribing provider:  Kim Rodriguez PA-C   Future Office Visit:  NO FUTURE DERM APPTS SCHEDULED

## 2023-10-24 NOTE — TELEPHONE ENCOUNTER
Spoke to patient who is scheduled to have labs drawn on 11-2-23.     He was scheduled for a follow up appt on Th 11-16-23 at 10 am.     Soo Villasenor RN

## 2023-11-02 ENCOUNTER — LAB (OUTPATIENT)
Dept: LAB | Facility: CLINIC | Age: 53
End: 2023-11-02
Payer: COMMERCIAL

## 2023-11-02 DIAGNOSIS — Z51.81 THERAPEUTIC DRUG MONITORING: ICD-10-CM

## 2023-11-02 DIAGNOSIS — E78.5 DYSLIPIDEMIA: ICD-10-CM

## 2023-11-02 DIAGNOSIS — I10 ESSENTIAL HYPERTENSION: ICD-10-CM

## 2023-11-02 LAB
ALBUMIN SERPL BCG-MCNC: 4.3 G/DL (ref 3.5–5.2)
ALP SERPL-CCNC: 80 U/L (ref 40–129)
ALT SERPL W P-5'-P-CCNC: 57 U/L (ref 0–70)
ANION GAP SERPL CALCULATED.3IONS-SCNC: 7 MMOL/L (ref 7–15)
AST SERPL W P-5'-P-CCNC: 35 U/L (ref 0–45)
BILIRUB SERPL-MCNC: 0.8 MG/DL
BUN SERPL-MCNC: 18.6 MG/DL (ref 6–20)
CALCIUM SERPL-MCNC: 9.5 MG/DL (ref 8.6–10)
CHLORIDE SERPL-SCNC: 105 MMOL/L (ref 98–107)
CREAT SERPL-MCNC: 1.19 MG/DL (ref 0.67–1.17)
DEPRECATED HCO3 PLAS-SCNC: 28 MMOL/L (ref 22–29)
EGFRCR SERPLBLD CKD-EPI 2021: 72 ML/MIN/1.73M2
ERYTHROCYTE [DISTWIDTH] IN BLOOD BY AUTOMATED COUNT: 12.5 % (ref 10–15)
GLUCOSE SERPL-MCNC: 116 MG/DL (ref 70–99)
HBV SURFACE AG SERPL QL IA: NONREACTIVE
HCT VFR BLD AUTO: 44.5 % (ref 40–53)
HCV AB SERPL QL IA: NONREACTIVE
HGB BLD-MCNC: 14.7 G/DL (ref 13.3–17.7)
MCH RBC QN AUTO: 29.2 PG (ref 26.5–33)
MCHC RBC AUTO-ENTMCNC: 33 G/DL (ref 31.5–36.5)
MCV RBC AUTO: 89 FL (ref 78–100)
PLATELET # BLD AUTO: 247 10E3/UL (ref 150–450)
POTASSIUM SERPL-SCNC: 4.8 MMOL/L (ref 3.4–5.3)
PROT SERPL-MCNC: 6.7 G/DL (ref 6.4–8.3)
RBC # BLD AUTO: 5.03 10E6/UL (ref 4.4–5.9)
SODIUM SERPL-SCNC: 140 MMOL/L (ref 135–145)
WBC # BLD AUTO: 5.2 10E3/UL (ref 4–11)

## 2023-11-02 PROCEDURE — 86803 HEPATITIS C AB TEST: CPT | Performed by: PHYSICIAN ASSISTANT

## 2023-11-02 PROCEDURE — 86481 TB AG RESPONSE T-CELL SUSP: CPT

## 2023-11-02 PROCEDURE — 80053 COMPREHEN METABOLIC PANEL: CPT

## 2023-11-02 PROCEDURE — 36415 COLL VENOUS BLD VENIPUNCTURE: CPT | Performed by: PHYSICIAN ASSISTANT

## 2023-11-02 PROCEDURE — 87340 HEPATITIS B SURFACE AG IA: CPT | Performed by: PHYSICIAN ASSISTANT

## 2023-11-02 PROCEDURE — 85027 COMPLETE CBC AUTOMATED: CPT

## 2023-11-02 RX ORDER — ATORVASTATIN CALCIUM 80 MG/1
80 TABLET, FILM COATED ORAL DAILY
Qty: 90 TABLET | Refills: 2 | Status: SHIPPED | OUTPATIENT
Start: 2023-11-02 | End: 2024-04-10

## 2023-11-02 RX ORDER — LISINOPRIL 20 MG/1
20 TABLET ORAL DAILY
Qty: 90 TABLET | Refills: 2 | Status: SHIPPED | OUTPATIENT
Start: 2023-11-02 | End: 2024-04-10

## 2023-11-02 NOTE — TELEPHONE ENCOUNTER
Spoke with patient who states he is no longer using [facility], requesting his refills go through the [facility].    Prescription approved per [facility] Refill Protocol.  Julie Behrendt RN

## 2023-11-04 LAB
GAMMA INTERFERON BACKGROUND BLD IA-ACNC: 0 IU/ML
M TB IFN-G BLD-IMP: NEGATIVE
M TB IFN-G CD4+ BCKGRND COR BLD-ACNC: 10 IU/ML
MITOGEN IGNF BCKGRD COR BLD-ACNC: 0 IU/ML
MITOGEN IGNF BCKGRD COR BLD-ACNC: 0.03 IU/ML
QUANTIFERON MITOGEN: 10 IU/ML
QUANTIFERON NIL TUBE: 0 IU/ML
QUANTIFERON TB1 TUBE: 0 IU/ML
QUANTIFERON TB2 TUBE: 0.03

## 2023-11-16 ENCOUNTER — OFFICE VISIT (OUTPATIENT)
Dept: DERMATOLOGY | Facility: CLINIC | Age: 53
End: 2023-11-16
Payer: COMMERCIAL

## 2023-11-16 DIAGNOSIS — Z51.81 THERAPEUTIC DRUG MONITORING: ICD-10-CM

## 2023-11-16 DIAGNOSIS — L40.9 PSORIASIS: Primary | ICD-10-CM

## 2023-11-16 LAB
ALBUMIN SERPL BCG-MCNC: 4.4 G/DL (ref 3.5–5.2)
ALP SERPL-CCNC: 82 U/L (ref 40–150)
ALT SERPL W P-5'-P-CCNC: 49 U/L (ref 0–70)
ANION GAP SERPL CALCULATED.3IONS-SCNC: 9 MMOL/L (ref 7–15)
AST SERPL W P-5'-P-CCNC: 34 U/L (ref 0–45)
BILIRUB SERPL-MCNC: 0.5 MG/DL
BUN SERPL-MCNC: 19.4 MG/DL (ref 6–20)
CALCIUM SERPL-MCNC: 9.2 MG/DL (ref 8.6–10)
CHLORIDE SERPL-SCNC: 105 MMOL/L (ref 98–107)
CREAT SERPL-MCNC: 1.14 MG/DL (ref 0.67–1.17)
DEPRECATED HCO3 PLAS-SCNC: 25 MMOL/L (ref 22–29)
EGFRCR SERPLBLD CKD-EPI 2021: 76 ML/MIN/1.73M2
ERYTHROCYTE [DISTWIDTH] IN BLOOD BY AUTOMATED COUNT: 12.4 % (ref 10–15)
GLUCOSE SERPL-MCNC: 106 MG/DL (ref 70–99)
HCT VFR BLD AUTO: 45.1 % (ref 40–53)
HGB BLD-MCNC: 14.8 G/DL (ref 13.3–17.7)
MCH RBC QN AUTO: 29 PG (ref 26.5–33)
MCHC RBC AUTO-ENTMCNC: 32.8 G/DL (ref 31.5–36.5)
MCV RBC AUTO: 88 FL (ref 78–100)
PLATELET # BLD AUTO: 258 10E3/UL (ref 150–450)
POTASSIUM SERPL-SCNC: 4.5 MMOL/L (ref 3.4–5.3)
PROT SERPL-MCNC: 6.8 G/DL (ref 6.4–8.3)
RBC # BLD AUTO: 5.1 10E6/UL (ref 4.4–5.9)
SODIUM SERPL-SCNC: 139 MMOL/L (ref 135–145)
WBC # BLD AUTO: 5.2 10E3/UL (ref 4–11)

## 2023-11-16 PROCEDURE — 99214 OFFICE O/P EST MOD 30 MIN: CPT | Performed by: PHYSICIAN ASSISTANT

## 2023-11-16 PROCEDURE — 85027 COMPLETE CBC AUTOMATED: CPT | Performed by: PHYSICIAN ASSISTANT

## 2023-11-16 PROCEDURE — 36415 COLL VENOUS BLD VENIPUNCTURE: CPT | Performed by: PHYSICIAN ASSISTANT

## 2023-11-16 PROCEDURE — 80053 COMPREHEN METABOLIC PANEL: CPT | Performed by: PHYSICIAN ASSISTANT

## 2023-11-16 RX ORDER — METHOTREXATE 2.5 MG/1
TABLET ORAL
Qty: 50 TABLET | Refills: 0 | Status: SHIPPED | OUTPATIENT
Start: 2023-11-16 | End: 2024-10-04

## 2023-11-16 ASSESSMENT — PAIN SCALES - GENERAL: PAINLEVEL: NO PAIN (0)

## 2023-11-16 NOTE — NURSING NOTE
Talon Sanchez's chief complaint for this visit includes:  Chief Complaint   Patient presents with    Derm Problem     Patient is here to discuss methotrexate. Patient took first month and then stopped was confused on plan. Patient is only using creams at this time with no help.      PCP: Orlin Marie    Referring Provider:  No referring provider defined for this encounter.    There were no vitals taken for this visit.  No Pain (0)        Allergies   Allergen Reactions    Amlodipine      Edema      Dairy Products [Milk Protein]     Dust Mites     Food Other (See Comments)     Eggs    Mold          Do you need any medication refills at today's visit? No    Tootie Toth MA

## 2023-11-16 NOTE — LETTER
11/16/2023         RE: Talon Sanchez  [address]   [place] [address]        Dear Colleague,    Thank you for referring your patient, Talon Sanchez, to the [facility]. Please see a copy of my visit note below.    HPI:   Chief complaints: Talon Sanchez is a pleasant 53 year old male who presents for recheck psoriasis. He started on Vtama cream and then switched to methotrexate. He took methotrexate for 1 month and then stopped because he wasn't sure if he should continue. He did feel he improved a little on methotrexate. No adverse effects. He has psoriasis in the groin, armpits and abdomen as well as both palms.       PHYSICAL EXAM:    There were no vitals taken for this visit.  Skin exam performed as follows: Type 2 skin. Mood appropriate  Alert and Oriented X 3. Well developed, well nourished in no distress.  General appearance: Normal  Head including face: Normal  Eyes: conjunctiva and lids: Normal  Mouth: Lips, teeth, gums: Normal  Neck: Normal  Skin: Scalp and body hair: See below    Psoriasiform dermatitis on bilateral palms with fissuring    ASSESSMENT/PLAN:     Inverse psoriasis, palmar psoriasis - discussed restarting methotrexate and he is amenable to trying this.   --Check CBC and CMP today  --Restart 5 tabs methotrexate weekly  --Folic acid on all other days  --Advised on potential side effects of increased infection (decreased immunity), liver dysfunction/damage, oncogenesis, decreased blood counts. Advised to avoid EtOH and acetaminophen.           Follow-up: 2-3 months  CC:   Scribed By: Kim Rodriguez, MS, PAMercyC      Again, thank you for allowing me to participate in the care of your patient.        Sincerely,        Kim Rodriguez PA-C

## 2023-11-16 NOTE — PROGRESS NOTES
HPI:   Chief complaints: Talon Sanchez is a pleasant 53 year old male who presents for recheck psoriasis. He started on Vtama cream and then switched to methotrexate. He took methotrexate for 1 month and then stopped because he wasn't sure if he should continue. He did feel he improved a little on methotrexate. No adverse effects. He has psoriasis in the groin, armpits and abdomen as well as both palms.       PHYSICAL EXAM:    There were no vitals taken for this visit.  Skin exam performed as follows: Type 2 skin. Mood appropriate  Alert and Oriented X 3. Well developed, well nourished in no distress.  General appearance: Normal  Head including face: Normal  Eyes: conjunctiva and lids: Normal  Mouth: Lips, teeth, gums: Normal  Neck: Normal  Skin: Scalp and body hair: See below    Psoriasiform dermatitis on bilateral palms with fissuring    ASSESSMENT/PLAN:     Inverse psoriasis, palmar psoriasis - discussed restarting methotrexate and he is amenable to trying this.   --Check CBC and CMP today  --Restart 5 tabs methotrexate weekly  --Folic acid on all other days  --Advised on potential side effects of increased infection (decreased immunity), liver dysfunction/damage, oncogenesis, decreased blood counts. Advised to avoid EtOH and acetaminophen.           Follow-up: 2-3 months  CC:   Scribed By: Kim Rodriguez, MS, PA-C

## 2024-01-15 ENCOUNTER — OFFICE VISIT (OUTPATIENT)
Dept: DERMATOLOGY | Facility: CLINIC | Age: 54
End: 2024-01-15
Payer: COMMERCIAL

## 2024-01-15 ENCOUNTER — TELEPHONE (OUTPATIENT)
Dept: DERMATOLOGY | Facility: CLINIC | Age: 54
End: 2024-01-15

## 2024-01-15 DIAGNOSIS — Z51.81 THERAPEUTIC DRUG MONITORING: ICD-10-CM

## 2024-01-15 DIAGNOSIS — L40.9 PSORIASIS: Primary | ICD-10-CM

## 2024-01-15 LAB
ALBUMIN SERPL BCG-MCNC: 4.2 G/DL (ref 3.5–5.2)
ALP SERPL-CCNC: 84 U/L (ref 40–150)
ALT SERPL W P-5'-P-CCNC: 73 U/L (ref 0–70)
ANION GAP SERPL CALCULATED.3IONS-SCNC: 10 MMOL/L (ref 7–15)
AST SERPL W P-5'-P-CCNC: 63 U/L (ref 0–45)
BILIRUB SERPL-MCNC: 0.3 MG/DL
BUN SERPL-MCNC: 14.1 MG/DL (ref 6–20)
CALCIUM SERPL-MCNC: 9.2 MG/DL (ref 8.6–10)
CHLORIDE SERPL-SCNC: 106 MMOL/L (ref 98–107)
CREAT SERPL-MCNC: 1.13 MG/DL (ref 0.67–1.17)
DEPRECATED HCO3 PLAS-SCNC: 25 MMOL/L (ref 22–29)
EGFRCR SERPLBLD CKD-EPI 2021: 77 ML/MIN/1.73M2
ERYTHROCYTE [DISTWIDTH] IN BLOOD BY AUTOMATED COUNT: 13.8 % (ref 10–15)
GLUCOSE SERPL-MCNC: 120 MG/DL (ref 70–99)
HCT VFR BLD AUTO: 44.7 % (ref 40–53)
HGB BLD-MCNC: 14.7 G/DL (ref 13.3–17.7)
MCH RBC QN AUTO: 29.2 PG (ref 26.5–33)
MCHC RBC AUTO-ENTMCNC: 32.9 G/DL (ref 31.5–36.5)
MCV RBC AUTO: 89 FL (ref 78–100)
PLATELET # BLD AUTO: 254 10E3/UL (ref 150–450)
POTASSIUM SERPL-SCNC: 4.8 MMOL/L (ref 3.4–5.3)
PROT SERPL-MCNC: 7 G/DL (ref 6.4–8.3)
RBC # BLD AUTO: 5.03 10E6/UL (ref 4.4–5.9)
SODIUM SERPL-SCNC: 141 MMOL/L (ref 135–145)
WBC # BLD AUTO: 7 10E3/UL (ref 4–11)

## 2024-01-15 PROCEDURE — 85027 COMPLETE CBC AUTOMATED: CPT | Performed by: PHYSICIAN ASSISTANT

## 2024-01-15 PROCEDURE — 99214 OFFICE O/P EST MOD 30 MIN: CPT | Performed by: PHYSICIAN ASSISTANT

## 2024-01-15 PROCEDURE — 36415 COLL VENOUS BLD VENIPUNCTURE: CPT | Performed by: PHYSICIAN ASSISTANT

## 2024-01-15 PROCEDURE — 80053 COMPREHEN METABOLIC PANEL: CPT | Performed by: PHYSICIAN ASSISTANT

## 2024-01-15 RX ORDER — METHOTREXATE 2.5 MG/1
TABLET ORAL
Qty: 84 TABLET | Refills: 1 | Status: SHIPPED | OUTPATIENT
Start: 2024-01-15 | End: 2024-10-04

## 2024-01-15 ASSESSMENT — PAIN SCALES - GENERAL: PAINLEVEL: NO PAIN (0)

## 2024-01-15 NOTE — LETTER
1/15/2024         RE: Talon Sanchez  [address]   [place] [address]        Dear Colleague,    Thank you for referring your patient, Talon Sanchez, to the [facility]. Please see a copy of my visit note below.    HPI:   Chief complaints: Talon Sanchez is a pleasant 53 year old male who presents for recheck psoriasis. He is taking 5 tablets of methotrexate weekly. He has had improvement but palms are not yet clear. Fold areas are clear.  No adverse effects. He has psoriasis in the groin, armpits and abdomen as well as both palms.       PHYSICAL EXAM:    There were no vitals taken for this visit.  Skin exam performed as follows: Type 2 skin. Mood appropriate  Alert and Oriented X 3. Well developed, well nourished in no distress.  General appearance: Normal  Head including face: Normal  Eyes: conjunctiva and lids: Normal  Mouth: Lips, teeth, gums: Normal  Neck: Normal  Skin: Scalp and body hair: See below    Psoriasiform dermatitis on bilateral palms with fissuring    ASSESSMENT/PLAN:     Inverse psoriasis, palmar psoriasis - Doing well. Improvement but not totally clear.   --Check CBC and CMP today  --Increase to 7 tabs methotrexate weekly  --Folic acid on all other days  --Advised on potential side effects of increased infection (decreased immunity), liver dysfunction/damage, oncogenesis, decreased blood counts. Advised to avoid EtOH and acetaminophen.           Follow-up: 2-3 months  CC:   Scribed By: Kim Rodriguez, MS, PABRANDT      Again, thank you for allowing me to participate in the care of your patient.        Sincerely,        Kim Rodriguez PA-C

## 2024-01-15 NOTE — TELEPHONE ENCOUNTER
----- Message from Kim Rodriguez PA-C sent at 1/15/2024  1:52 PM CST -----  Talon's LFTs are a little elevated. Any recent EtOH or Tylenol?   They are ok to continue methotrexate but I will recheck them at his next visit.

## 2024-01-15 NOTE — PROGRESS NOTES
HPI:   Chief complaints: Talon Sanchez is a pleasant 53 year old male who presents for recheck psoriasis. He is taking 5 tablets of methotrexate weekly. He has had improvement but palms are not yet clear. Fold areas are clear.  No adverse effects. He has psoriasis in the groin, armpits and abdomen as well as both palms.       PHYSICAL EXAM:    There were no vitals taken for this visit.  Skin exam performed as follows: Type 2 skin. Mood appropriate  Alert and Oriented X 3. Well developed, well nourished in no distress.  General appearance: Normal  Head including face: Normal  Eyes: conjunctiva and lids: Normal  Mouth: Lips, teeth, gums: Normal  Neck: Normal  Skin: Scalp and body hair: See below    Psoriasiform dermatitis on bilateral palms with fissuring    ASSESSMENT/PLAN:     Inverse psoriasis, palmar psoriasis - Doing well. Improvement but not totally clear.   --Check CBC and CMP today  --Increase to 7 tabs methotrexate weekly  --Folic acid on all other days  --Advised on potential side effects of increased infection (decreased immunity), liver dysfunction/damage, oncogenesis, decreased blood counts. Advised to avoid EtOH and acetaminophen.           Follow-up: 2-3 months  CC:   Scribed By: Kim Rodriguez, MS, PABRANDT

## 2024-01-15 NOTE — TELEPHONE ENCOUNTER
Patient Contact    Attempt # 1    Was call answered?  No    Called patient. No answer. Left message to either call back.\ or reply via My chart as I released the Provider result note to patient to read if that is easier for patient to reach us.     Clinic number was provided.     Soo Villasenor RN    [facility]   [phone]

## 2024-01-16 NOTE — TELEPHONE ENCOUNTER
See My chart messages. Recheck lab in 2 weeks per Provider and lab ordered, patient advised via My chart message. Soo Villasenor RN

## 2024-01-16 NOTE — TELEPHONE ENCOUNTER
Patient read My chart results. He did not reply, so I sent him a My chart message. Soo Villasenor RN

## 2024-02-10 ENCOUNTER — HEALTH MAINTENANCE LETTER (OUTPATIENT)
Age: 54
End: 2024-02-10

## 2024-04-02 ENCOUNTER — ORDERS ONLY (AUTO-RELEASED) (OUTPATIENT)
Dept: FAMILY MEDICINE | Facility: CLINIC | Age: 54
End: 2024-04-02

## 2024-04-02 ENCOUNTER — OFFICE VISIT (OUTPATIENT)
Dept: FAMILY MEDICINE | Facility: CLINIC | Age: 54
End: 2024-04-02
Attending: PHYSICIAN ASSISTANT
Payer: COMMERCIAL

## 2024-04-02 VITALS
HEIGHT: 71 IN | HEART RATE: 60 BPM | BODY MASS INDEX: 24.92 KG/M2 | OXYGEN SATURATION: 99 % | RESPIRATION RATE: 14 BRPM | DIASTOLIC BLOOD PRESSURE: 74 MMHG | SYSTOLIC BLOOD PRESSURE: 110 MMHG | WEIGHT: 178 LBS | TEMPERATURE: 97.3 F

## 2024-04-02 DIAGNOSIS — Z12.11 SCREEN FOR COLON CANCER: ICD-10-CM

## 2024-04-02 DIAGNOSIS — E11.69 TYPE 2 DIABETES MELLITUS WITH OTHER SPECIFIED COMPLICATION, WITHOUT LONG-TERM CURRENT USE OF INSULIN (H): ICD-10-CM

## 2024-04-02 DIAGNOSIS — E11.69 TYPE 2 DIABETES MELLITUS WITH OTHER SPECIFIED COMPLICATION, WITHOUT LONG-TERM CURRENT USE OF INSULIN (H): Primary | ICD-10-CM

## 2024-04-02 LAB
ALBUMIN SERPL BCG-MCNC: 4.5 G/DL (ref 3.5–5.2)
ALP SERPL-CCNC: 89 U/L (ref 40–150)
ALT SERPL W P-5'-P-CCNC: 48 U/L (ref 0–70)
ANION GAP SERPL CALCULATED.3IONS-SCNC: 9 MMOL/L (ref 7–15)
AST SERPL W P-5'-P-CCNC: 31 U/L (ref 0–45)
BILIRUB SERPL-MCNC: 0.4 MG/DL
BUN SERPL-MCNC: 21.1 MG/DL (ref 6–20)
CALCIUM SERPL-MCNC: 9.2 MG/DL (ref 8.6–10)
CHLORIDE SERPL-SCNC: 105 MMOL/L (ref 98–107)
CREAT SERPL-MCNC: 1.21 MG/DL (ref 0.67–1.17)
DEPRECATED HCO3 PLAS-SCNC: 26 MMOL/L (ref 22–29)
EGFRCR SERPLBLD CKD-EPI 2021: 71 ML/MIN/1.73M2
GLUCOSE SERPL-MCNC: 119 MG/DL (ref 70–99)
HBA1C MFR BLD: 7.5 % (ref 0–5.6)
POTASSIUM SERPL-SCNC: 5 MMOL/L (ref 3.4–5.3)
PROT SERPL-MCNC: 7 G/DL (ref 6.4–8.3)
SODIUM SERPL-SCNC: 140 MMOL/L (ref 135–145)

## 2024-04-02 PROCEDURE — 99213 OFFICE O/P EST LOW 20 MIN: CPT | Performed by: FAMILY MEDICINE

## 2024-04-02 PROCEDURE — 83036 HEMOGLOBIN GLYCOSYLATED A1C: CPT | Performed by: FAMILY MEDICINE

## 2024-04-02 PROCEDURE — 80053 COMPREHEN METABOLIC PANEL: CPT | Performed by: FAMILY MEDICINE

## 2024-04-02 PROCEDURE — 36415 COLL VENOUS BLD VENIPUNCTURE: CPT | Performed by: FAMILY MEDICINE

## 2024-04-02 PROCEDURE — 99207 PR FOOT EXAM NO CHARGE: CPT | Performed by: FAMILY MEDICINE

## 2024-04-02 RX ORDER — METFORMIN HCL 500 MG
1000 TABLET, EXTENDED RELEASE 24 HR ORAL 2 TIMES DAILY WITH MEALS
Qty: 360 TABLET | Refills: 3 | Status: SHIPPED | OUTPATIENT
Start: 2024-04-02 | End: 2024-07-01

## 2024-04-02 RX ORDER — METFORMIN HCL 500 MG
TABLET, EXTENDED RELEASE 24 HR ORAL
Qty: 180 TABLET | Refills: 1 | OUTPATIENT
Start: 2024-04-02

## 2024-04-02 ASSESSMENT — PAIN SCALES - GENERAL: PAINLEVEL: NO PAIN (0)

## 2024-04-02 NOTE — NURSING NOTE
"Chief Complaint   Patient presents with    Diabetes       Initial /74   Pulse 60   Temp 97.3  F (36.3  C) (Tympanic)   Resp 14   Ht 1.803 m (5' 11\")   Wt 80.7 kg (178 lb)   SpO2 99%   BMI 24.83 kg/m   Estimated body mass index is 24.83 kg/m  as calculated from the following:    Height as of this encounter: 1.803 m (5' 11\").    Weight as of this encounter: 80.7 kg (178 lb).    Patient presents to the clinic using No DME    Is there anyone who you would like to be able to receive your results? No  If yes have patient fill out GALI      "

## 2024-04-02 NOTE — PROGRESS NOTES
"  Assessment & Plan     Type 2 diabetes mellitus with other specified complication, without long-term current use of insulin (H)  Hgba1c stable at 7.5. Discussed with cardiovascular risks he has, hgba1c goal should be <7. Plan on cutting out soda, increasing metformin to 1000mg BID. Ok to taper up slowly. Follow up in 6 months to a year  - HEMOGLOBIN A1C; Future  - metFORMIN (GLUCOPHAGE XR) 500 MG 24 hr tablet; Take 2 tablets (1,000 mg) by mouth 2 times daily (with meals) for 90 days 2 tabs daily with food.  - Comprehensive metabolic panel (BMP + Alb, Alk Phos, ALT, AST, Total. Bili, TP); Future  - HEMOGLOBIN A1C    Screen for colon cancer  - [employer]); Future                  Subjective   Talon is a 53 year old, presenting for the following health issues:  Diabetes        4/2/2024    10:29 AM   Additional Questions   Roomed by Flor MONTELONGO   Accompanied by self     History of Present Illness       Diabetes:   He presents for follow up of diabetes.  He is checking home blood glucose a few times a month.   He checks blood glucose before meals.  Blood glucose is never over 200 and never under 70.     He has no concerns regarding his diabetes at this time.  He is having blurry vision.            He eats 2-3 servings of fruits and vegetables daily.He consumes 1 sweetened beverage(s) daily.He exercises with enough effort to increase his heart rate 10 to 19 minutes per day.  He exercises with enough effort to increase his heart rate 3 or less days per week.   He is taking medications regularly.                     Objective    /74   Pulse 60   Temp 97.3  F (36.3  C) (Tympanic)   Resp 14   Ht 1.803 m (5' 11\")   Wt 80.7 kg (178 lb)   SpO2 99%   BMI 24.83 kg/m    Body mass index is 24.83 kg/m .  Physical Exam  Vitals reviewed.   Cardiovascular:      Rate and Rhythm: Normal rate.      Pulses:           Dorsalis pedis pulses are 2+ on the right side and 2+ on the left side.        Posterior tibial pulses " are 2+ on the right side and 2+ on the left side.   Pulmonary:      Effort: Pulmonary effort is normal.   Musculoskeletal:      Right foot: Normal range of motion.      Left foot: Normal range of motion.   Feet:      Right foot:      Protective Sensation: 7 sites tested.  7 sites sensed.      Skin integrity: Skin integrity normal.      Left foot:      Protective Sensation: 7 sites tested.  7 sites sensed.      Skin integrity: Skin integrity normal.   Neurological:      Mental Status: He is alert.                    Signed Electronically by: Allison Avelar MD

## 2024-04-10 ENCOUNTER — MYC REFILL (OUTPATIENT)
Dept: DERMATOLOGY | Facility: CLINIC | Age: 54
End: 2024-04-10
Payer: COMMERCIAL

## 2024-04-10 ENCOUNTER — MYC REFILL (OUTPATIENT)
Dept: FAMILY MEDICINE | Facility: CLINIC | Age: 54
End: 2024-04-10
Payer: COMMERCIAL

## 2024-04-10 DIAGNOSIS — E78.5 DYSLIPIDEMIA: ICD-10-CM

## 2024-04-10 DIAGNOSIS — I10 ESSENTIAL HYPERTENSION: ICD-10-CM

## 2024-04-10 DIAGNOSIS — L40.9 PSORIASIS: ICD-10-CM

## 2024-04-11 RX ORDER — FOLIC ACID 1 MG/1
TABLET ORAL
Qty: 90 TABLET | Refills: 2 | Status: SHIPPED | OUTPATIENT
Start: 2024-04-11 | End: 2024-10-04

## 2024-04-11 NOTE — TELEPHONE ENCOUNTER
Requested Prescriptions   Pending Prescriptions Disp Refills    lisinopril (ZESTRIL) 20 MG tablet 90 tablet 2     Sig: Take 1 tablet (20 mg) by mouth daily       ACE Inhibitors (Including Combos) Protocol Failed - 4/10/2024  5:26 PM        Failed - Normal serum creatinine on file in past 12 months     Recent Labs   Lab Test 04/02/24  1055   CR 1.21*                 Passed - Blood pressure under 140/90 in past 12 months- Clinicial or Patient Reported     BP Readings from Last 3 Encounters:   04/02/24 110/74   09/28/23 110/70   04/13/23 120/73       No data recorded            Passed - Medication is active on med list        Passed - Medication indicated for associated diagnosis     Medication is associated with one or more of the following diagnoses:     Chronic Kidney Disease (CKD)   Coronary Artery Disease (CAD)   Diabetes   Heart Failure (HF)   Hypertension (HTN)   Nephropathy            Passed - Has GFR on file in past 12 months and most recent value is normal        Passed - Recent (12 mo) or future (90 days) visit within the authorizing provider's specialty     The patient must have completed an in-person or virtual visit within the past 12 months or has a future visit scheduled within the next 90 days with the authorizing provider s specialty.  Urgent care and e-visits do not quality as an office visit for this protocol.          Passed - Patient is age 18 or older        Passed - Normal serum potassium on file in past 12 months     Recent Labs   Lab Test 04/02/24  1055   POTASSIUM 5.0               atorvastatin (LIPITOR) 80 MG tablet 90 tablet 2     Sig: Take 1 tablet (80 mg) by mouth daily       Antihyperlipidemic agents Passed - 4/10/2024  5:26 PM        Passed - LDL on file in the past 12 months        Passed - Medication is active on med list        Passed - Recent (12 mo) or future (90 days) visit within the authorizing provider's specialty     The patient must have completed an in-person or virtual  visit within the past 12 months or has a future visit scheduled within the next 90 days with the authorizing provider s specialty.  Urgent care and e-visits do not quality as an office visit for this protocol.          Passed - Patient is age 18 years or older

## 2024-04-12 RX ORDER — ATORVASTATIN CALCIUM 80 MG/1
80 TABLET, FILM COATED ORAL DAILY
Qty: 90 TABLET | Refills: 2 | Status: SHIPPED | OUTPATIENT
Start: 2024-04-12

## 2024-04-12 RX ORDER — LISINOPRIL 20 MG/1
20 TABLET ORAL DAILY
Qty: 90 TABLET | Refills: 2 | Status: SHIPPED | OUTPATIENT
Start: 2024-04-12

## 2024-04-13 ENCOUNTER — LAB (OUTPATIENT)
Dept: LAB | Facility: CLINIC | Age: 54
End: 2024-04-13
Payer: COMMERCIAL

## 2024-04-13 DIAGNOSIS — Z51.81 THERAPEUTIC DRUG MONITORING: ICD-10-CM

## 2024-04-13 LAB
ALBUMIN SERPL BCG-MCNC: 4 G/DL (ref 3.5–5.2)
ALP SERPL-CCNC: 90 U/L (ref 40–150)
ALT SERPL W P-5'-P-CCNC: 47 U/L (ref 0–70)
ANION GAP SERPL CALCULATED.3IONS-SCNC: 5 MMOL/L (ref 7–15)
AST SERPL W P-5'-P-CCNC: 32 U/L (ref 0–45)
BILIRUB SERPL-MCNC: 0.3 MG/DL
BUN SERPL-MCNC: 22.9 MG/DL (ref 6–20)
CALCIUM SERPL-MCNC: 9.1 MG/DL (ref 8.6–10)
CHLORIDE SERPL-SCNC: 106 MMOL/L (ref 98–107)
CREAT SERPL-MCNC: 1.11 MG/DL (ref 0.67–1.17)
DEPRECATED HCO3 PLAS-SCNC: 28 MMOL/L (ref 22–29)
EGFRCR SERPLBLD CKD-EPI 2021: 78 ML/MIN/1.73M2
ERYTHROCYTE [DISTWIDTH] IN BLOOD BY AUTOMATED COUNT: 13.1 % (ref 10–15)
GLUCOSE SERPL-MCNC: 119 MG/DL (ref 70–99)
HCT VFR BLD AUTO: 41.4 % (ref 40–53)
HGB BLD-MCNC: 13.7 G/DL (ref 13.3–17.7)
MCH RBC QN AUTO: 30 PG (ref 26.5–33)
MCHC RBC AUTO-ENTMCNC: 33.1 G/DL (ref 31.5–36.5)
MCV RBC AUTO: 91 FL (ref 78–100)
PLATELET # BLD AUTO: 249 10E3/UL (ref 150–450)
POTASSIUM SERPL-SCNC: 4.6 MMOL/L (ref 3.4–5.3)
PROT SERPL-MCNC: 6.6 G/DL (ref 6.4–8.3)
RBC # BLD AUTO: 4.56 10E6/UL (ref 4.4–5.9)
SODIUM SERPL-SCNC: 139 MMOL/L (ref 135–145)
WBC # BLD AUTO: 5.9 10E3/UL (ref 4–11)

## 2024-04-13 PROCEDURE — 85027 COMPLETE CBC AUTOMATED: CPT

## 2024-04-13 PROCEDURE — 80053 COMPREHEN METABOLIC PANEL: CPT

## 2024-04-13 PROCEDURE — 36415 COLL VENOUS BLD VENIPUNCTURE: CPT

## 2024-04-29 ENCOUNTER — OFFICE VISIT (OUTPATIENT)
Dept: DERMATOLOGY | Facility: CLINIC | Age: 54
End: 2024-04-29
Payer: COMMERCIAL

## 2024-04-29 DIAGNOSIS — Z51.81 THERAPEUTIC DRUG MONITORING: Primary | ICD-10-CM

## 2024-04-29 DIAGNOSIS — L40.9 PSORIASIS: ICD-10-CM

## 2024-04-29 LAB
ERYTHROCYTE [DISTWIDTH] IN BLOOD BY AUTOMATED COUNT: 13 % (ref 10–15)
HCT VFR BLD AUTO: 45.6 % (ref 40–53)
HGB BLD-MCNC: 15.1 G/DL (ref 13.3–17.7)
MCH RBC QN AUTO: 29.6 PG (ref 26.5–33)
MCHC RBC AUTO-ENTMCNC: 33.1 G/DL (ref 31.5–36.5)
MCV RBC AUTO: 89 FL (ref 78–100)
PLATELET # BLD AUTO: 272 10E3/UL (ref 150–450)
RBC # BLD AUTO: 5.1 10E6/UL (ref 4.4–5.9)
WBC # BLD AUTO: 6 10E3/UL (ref 4–11)

## 2024-04-29 PROCEDURE — 85027 COMPLETE CBC AUTOMATED: CPT | Performed by: PHYSICIAN ASSISTANT

## 2024-04-29 PROCEDURE — 99213 OFFICE O/P EST LOW 20 MIN: CPT | Performed by: PHYSICIAN ASSISTANT

## 2024-04-29 ASSESSMENT — PAIN SCALES - GENERAL: PAINLEVEL: NO PAIN (0)

## 2024-04-29 NOTE — PROGRESS NOTES
HPI:   Chief complaints: Talon Sanchez is a pleasant 53 year old male who presents for recheck psoriasis. He is taking 7 tablets of methotrexate weekly. He just started this a few weeks ago as he wanted to give 5 and 6 tablets a longer trial. He has had improvement but palms are not yet clear. Fold areas are clear.  No adverse effects. He normally has psoriasis in the groin, armpits and abdomen as well as both palms.       PHYSICAL EXAM:    There were no vitals taken for this visit.  Skin exam performed as follows: Type 2 skin. Mood appropriate  Alert and Oriented X 3. Well developed, well nourished in no distress.  General appearance: Normal  Head including face: Normal  Eyes: conjunctiva and lids: Normal  Mouth: Lips, teeth, gums: Normal  Neck: Normal  Skin: Scalp and body hair: See below    Mild psoriasiform dermatitis on bilateral palms    ASSESSMENT/PLAN:     Inverse psoriasis, palmar psoriasis - Doing well. Palms are not clear but are mild today. Overall he is happy with results and he would like to continue methotrexate. He is moving to [place] in 1 month and will establish with derm in [place].   --Check CBC and CMP today  --Continue 7 tabs methotrexate weekly  --Folic acid on all other days  --Advised on potential side effects of increased infection (decreased immunity), liver dysfunction/damage, oncogenesis, decreased blood counts. Advised to avoid EtOH and acetaminophen.           Follow-up: derm in [place]/PRN as needed here  CC:   Scribed By: Kim Rodriguez, MS, PABRANDT

## 2024-04-29 NOTE — LETTER
4/29/2024         RE: Talon Sanchez  [address]   [facility] [address]        Dear Colleague,    Thank you for referring your patient, Talon Sanchez, to the [facility]. Please see a copy of my visit note below.    HPI:   Chief complaints: Talon Sanchez is a pleasant 53 year old male who presents for recheck psoriasis. He is taking 7 tablets of methotrexate weekly. He just started this a few weeks ago as he wanted to give 5 and 6 tablets a longer trial. He has had improvement but palms are not yet clear. Fold areas are clear.  No adverse effects. He normally has psoriasis in the groin, armpits and abdomen as well as both palms.       PHYSICAL EXAM:    There were no vitals taken for this visit.  Skin exam performed as follows: Type 2 skin. Mood appropriate  Alert and Oriented X 3. Well developed, well nourished in no distress.  General appearance: Normal  Head including face: Normal  Eyes: conjunctiva and lids: Normal  Mouth: Lips, teeth, gums: Normal  Neck: Normal  Skin: Scalp and body hair: See below    Mild psoriasiform dermatitis on bilateral palms    ASSESSMENT/PLAN:     Inverse psoriasis, palmar psoriasis - Doing well. Palms are not clear but are mild today. Overall he is happy with results and he would like to continue methotrexate. He is moving to [place] in 1 month and will establish with derm in [place].   --Check CBC and CMP today  --Continue 7 tabs methotrexate weekly  --Folic acid on all other days  --Advised on potential side effects of increased infection (decreased immunity), liver dysfunction/damage, oncogenesis, decreased blood counts. Advised to avoid EtOH and acetaminophen.           Follow-up: derm in [place]/PRN as needed here  CC:   Scribed By: Kim Rodriguez, MS, PABRANDT      Again, thank you for allowing me to participate in the care of your patient.        Sincerely,        Kim Rodriguez PA-C

## 2024-05-30 LAB — NONINV COLON CA DNA+OCC BLD SCRN STL QL: NEGATIVE

## 2024-08-29 ENCOUNTER — PATIENT OUTREACH (OUTPATIENT)
Dept: CARE COORDINATION | Facility: CLINIC | Age: 54
End: 2024-08-29
Payer: COMMERCIAL

## 2024-09-07 ENCOUNTER — HEALTH MAINTENANCE LETTER (OUTPATIENT)
Age: 54
End: 2024-09-07

## 2024-09-12 ENCOUNTER — PATIENT OUTREACH (OUTPATIENT)
Dept: CARE COORDINATION | Facility: CLINIC | Age: 54
End: 2024-09-12
Payer: COMMERCIAL

## 2024-10-04 ENCOUNTER — MYC MEDICAL ADVICE (OUTPATIENT)
Dept: DERMATOLOGY | Facility: CLINIC | Age: 54
End: 2024-10-04
Payer: COMMERCIAL

## 2024-10-04 DIAGNOSIS — L40.9 PSORIASIS: ICD-10-CM

## 2024-10-04 RX ORDER — METHOTREXATE 2.5 MG/1
TABLET ORAL
Qty: 84 TABLET | Refills: 0 | Status: SHIPPED | OUTPATIENT
Start: 2024-10-04

## 2024-10-04 RX ORDER — FOLIC ACID 1 MG/1
TABLET ORAL
Qty: 90 TABLET | Refills: 1 | Status: SHIPPED | OUTPATIENT
Start: 2024-10-04

## 2024-10-04 NOTE — TELEPHONE ENCOUNTER
Imani refill provided. He does need to establish with someone closer to home or follow-up with us as he needs blood work done every 6 months while on methotrexate.

## 2024-11-13 ENCOUNTER — TELEPHONE (OUTPATIENT)
Dept: FAMILY MEDICINE | Facility: CLINIC | Age: 54
End: 2024-11-13
Payer: COMMERCIAL

## 2024-11-13 NOTE — TELEPHONE ENCOUNTER
Patient Quality Outreach    Patient is due for the following:   Diabetes -  A1C, Eye Exam, and Microalbumin  Physical Preventive Adult Physical    Action(s) Taken:   Schedule a Adult Preventative    Type of outreach:    Sent [employer] message.    Questions for provider review:    None           Courtnye Loaiza, [facility]

## 2024-11-16 ENCOUNTER — HEALTH MAINTENANCE LETTER (OUTPATIENT)
Age: 54
End: 2024-11-16

## 2025-01-05 ENCOUNTER — HEALTH MAINTENANCE LETTER (OUTPATIENT)
Age: 55
End: 2025-01-05